# Patient Record
Sex: FEMALE | Race: WHITE | Employment: OTHER | URBAN - METROPOLITAN AREA
[De-identification: names, ages, dates, MRNs, and addresses within clinical notes are randomized per-mention and may not be internally consistent; named-entity substitution may affect disease eponyms.]

---

## 2017-01-20 ENCOUNTER — OFFICE VISIT (OUTPATIENT)
Dept: FAMILY MEDICINE CLINIC | Age: 82
End: 2017-01-20

## 2017-01-20 VITALS
BODY MASS INDEX: 18.52 KG/M2 | SYSTOLIC BLOOD PRESSURE: 122 MMHG | OXYGEN SATURATION: 91 % | DIASTOLIC BLOOD PRESSURE: 70 MMHG | WEIGHT: 98 LBS | HEART RATE: 97 BPM

## 2017-01-20 DIAGNOSIS — M15.9 PRIMARY OSTEOARTHRITIS INVOLVING MULTIPLE JOINTS: ICD-10-CM

## 2017-01-20 DIAGNOSIS — I10 ESSENTIAL HYPERTENSION: ICD-10-CM

## 2017-01-20 DIAGNOSIS — F51.05 INSOMNIA DUE TO OTHER MENTAL DISORDER: ICD-10-CM

## 2017-01-20 DIAGNOSIS — R30.0 DYSURIA: ICD-10-CM

## 2017-01-20 DIAGNOSIS — G89.4 CHRONIC PAIN SYNDROME: Primary | ICD-10-CM

## 2017-01-20 DIAGNOSIS — E53.8 VITAMIN B 12 DEFICIENCY: ICD-10-CM

## 2017-01-20 DIAGNOSIS — R63.4 WEIGHT LOSS: ICD-10-CM

## 2017-01-20 DIAGNOSIS — F01.50 VASCULAR DEMENTIA WITHOUT BEHAVIORAL DISTURBANCE (HCC): ICD-10-CM

## 2017-01-20 DIAGNOSIS — F99 INSOMNIA DUE TO OTHER MENTAL DISORDER: ICD-10-CM

## 2017-01-20 PROBLEM — G47.00 INSOMNIA: Status: ACTIVE | Noted: 2017-01-20

## 2017-01-20 LAB
BILIRUBIN, POC: NORMAL
BLOOD URINE, POC: NORMAL
CLARITY, POC: NORMAL
COLOR, POC: NORMAL
GLUCOSE URINE, POC: NORMAL
KETONES, POC: NORMAL
LEUKOCYTE EST, POC: NORMAL
NITRITE, POC: NORMAL
PH, POC: 6
PROTEIN, POC: NORMAL
SPECIFIC GRAVITY, POC: <=1.005
UROBILINOGEN, POC: 0.2

## 2017-01-20 PROCEDURE — 81002 URINALYSIS NONAUTO W/O SCOPE: CPT | Performed by: FAMILY MEDICINE

## 2017-01-20 PROCEDURE — 99214 OFFICE O/P EST MOD 30 MIN: CPT | Performed by: FAMILY MEDICINE

## 2017-01-20 RX ORDER — SULFAMETHOXAZOLE AND TRIMETHOPRIM 800; 160 MG/1; MG/1
1 TABLET ORAL 2 TIMES DAILY
Qty: 10 TABLET | Refills: 0 | Status: SHIPPED | OUTPATIENT
Start: 2017-01-20 | End: 2017-04-24 | Stop reason: ALTCHOICE

## 2017-01-20 RX ORDER — OXYCODONE HYDROCHLORIDE 10 MG/1
TABLET ORAL
Qty: 60 TABLET | Refills: 0 | Status: SHIPPED | OUTPATIENT
Start: 2017-01-20 | End: 2017-02-20 | Stop reason: SDUPTHER

## 2017-01-20 ASSESSMENT — ENCOUNTER SYMPTOMS
RESPIRATORY NEGATIVE: 1
GASTROINTESTINAL NEGATIVE: 1

## 2017-02-20 RX ORDER — OXYCODONE HYDROCHLORIDE 10 MG/1
TABLET ORAL
Qty: 60 TABLET | Refills: 0 | Status: SHIPPED | OUTPATIENT
Start: 2017-02-20 | End: 2017-03-22 | Stop reason: SDUPTHER

## 2017-02-24 ENCOUNTER — OFFICE VISIT (OUTPATIENT)
Dept: FAMILY MEDICINE CLINIC | Age: 82
End: 2017-02-24

## 2017-02-24 VITALS
HEART RATE: 96 BPM | SYSTOLIC BLOOD PRESSURE: 130 MMHG | OXYGEN SATURATION: 94 % | WEIGHT: 95 LBS | BODY MASS INDEX: 17.95 KG/M2 | DIASTOLIC BLOOD PRESSURE: 70 MMHG

## 2017-02-24 DIAGNOSIS — G89.4 CHRONIC PAIN SYNDROME: Primary | ICD-10-CM

## 2017-02-24 DIAGNOSIS — M15.9 PRIMARY OSTEOARTHRITIS INVOLVING MULTIPLE JOINTS: ICD-10-CM

## 2017-02-24 PROCEDURE — 99213 OFFICE O/P EST LOW 20 MIN: CPT | Performed by: FAMILY MEDICINE

## 2017-02-24 ASSESSMENT — ENCOUNTER SYMPTOMS
GASTROINTESTINAL NEGATIVE: 1
BACK PAIN: 1
RESPIRATORY NEGATIVE: 1

## 2017-03-21 ENCOUNTER — HOSPITAL ENCOUNTER (OUTPATIENT)
Dept: OTHER | Age: 82
Discharge: HOME | End: 2017-03-21
Attending: FAMILY MEDICINE | Admitting: FAMILY MEDICINE

## 2017-03-21 LAB
A/G RATIO: 1.4 (ref 1.1–2.2)
ALBUMIN SERPL-MCNC: 4.1 G/DL (ref 3.4–5)
ALP BLD-CCNC: 67 U/L (ref 40–129)
ALT SERPL-CCNC: 7 U/L (ref 10–40)
ANION GAP SERPL CALCULATED.3IONS-SCNC: 13 MMOL/L (ref 3–16)
AST SERPL-CCNC: 18 U/L (ref 15–37)
BASOPHILS ABSOLUTE: 0 K/UL (ref 0–0.2)
BASOPHILS RELATIVE PERCENT: 0.9 %
BILIRUB SERPL-MCNC: 0.7 MG/DL (ref 0–1)
BUN BLDV-MCNC: 10 MG/DL (ref 7–20)
CALCIUM SERPL-MCNC: 9.5 MG/DL (ref 8.3–10.6)
CHLORIDE BLD-SCNC: 99 MMOL/L (ref 99–110)
CO2: 24 MMOL/L (ref 21–32)
CREAT SERPL-MCNC: 1.1 MG/DL (ref 0.6–1.2)
EOSINOPHILS ABSOLUTE: 0.2 K/UL (ref 0–0.6)
EOSINOPHILS RELATIVE PERCENT: 4.3 %
FOLATE: 11.72 NG/ML (ref 4.78–24.2)
GFR AFRICAN AMERICAN: 58
GFR NON-AFRICAN AMERICAN: 48
GLOBULIN: 2.9 G/DL
GLUCOSE BLD-MCNC: 99 MG/DL (ref 70–99)
HCT VFR BLD CALC: 34.1 % (ref 36–48)
HEMOGLOBIN: 11.3 G/DL (ref 12–16)
LYMPHOCYTES ABSOLUTE: 1.4 K/UL (ref 1–5.1)
LYMPHOCYTES RELATIVE PERCENT: 33.9 %
MCH RBC QN AUTO: 32.7 PG (ref 26–34)
MCHC RBC AUTO-ENTMCNC: 33.1 G/DL (ref 31–36)
MCV RBC AUTO: 98.6 FL (ref 80–100)
MONOCYTES ABSOLUTE: 0.3 K/UL (ref 0–1.3)
MONOCYTES RELATIVE PERCENT: 7.7 %
NEUTROPHILS ABSOLUTE: 2.1 K/UL (ref 1.7–7.7)
NEUTROPHILS RELATIVE PERCENT: 53.2 %
PDW BLD-RTO: 13.1 % (ref 12.4–15.4)
PLATELET # BLD: 256 K/UL (ref 135–450)
PMV BLD AUTO: 10.2 FL (ref 5–10.5)
POTASSIUM SERPL-SCNC: 4.6 MMOL/L (ref 3.5–5.1)
RBC # BLD: 3.45 M/UL (ref 4–5.2)
SEDIMENTATION RATE, ERYTHROCYTE: 22 MM/HR (ref 0–30)
SODIUM BLD-SCNC: 136 MMOL/L (ref 136–145)
TOTAL PROTEIN: 7 G/DL (ref 6.4–8.2)
TSH SERPL DL<=0.05 MIU/L-ACNC: 0.27 UIU/ML (ref 0.27–4.2)
VITAMIN B-12: 742 PG/ML (ref 211–911)
WBC # BLD: 4 K/UL (ref 4–11)

## 2017-03-22 RX ORDER — OXYCODONE HYDROCHLORIDE 10 MG/1
TABLET ORAL
Qty: 60 TABLET | Refills: 0 | Status: SHIPPED | OUTPATIENT
Start: 2017-03-22 | End: 2017-04-24 | Stop reason: SDUPTHER

## 2017-04-17 RX ORDER — CITALOPRAM 20 MG/1
TABLET ORAL
Qty: 90 TABLET | Refills: 0 | Status: SHIPPED | OUTPATIENT
Start: 2017-04-17 | End: 2017-04-24 | Stop reason: ALTCHOICE

## 2017-04-24 ENCOUNTER — OFFICE VISIT (OUTPATIENT)
Dept: FAMILY MEDICINE CLINIC | Age: 82
End: 2017-04-24

## 2017-04-24 VITALS
BODY MASS INDEX: 18.1 KG/M2 | OXYGEN SATURATION: 96 % | WEIGHT: 95.8 LBS | HEART RATE: 88 BPM | RESPIRATION RATE: 16 BRPM | DIASTOLIC BLOOD PRESSURE: 78 MMHG | SYSTOLIC BLOOD PRESSURE: 124 MMHG

## 2017-04-24 DIAGNOSIS — M15.9 PRIMARY OSTEOARTHRITIS INVOLVING MULTIPLE JOINTS: ICD-10-CM

## 2017-04-24 DIAGNOSIS — G89.4 CHRONIC PAIN SYNDROME: Primary | ICD-10-CM

## 2017-04-24 PROCEDURE — 99213 OFFICE O/P EST LOW 20 MIN: CPT | Performed by: NURSE PRACTITIONER

## 2017-04-24 RX ORDER — OXYCODONE HYDROCHLORIDE 10 MG/1
TABLET ORAL
Qty: 60 TABLET | Refills: 0 | Status: CANCELLED | OUTPATIENT
Start: 2017-04-24

## 2017-04-24 RX ORDER — MISOPROSTOL 200 UG/1
200 TABLET ORAL
COMMUNITY
End: 2017-10-16 | Stop reason: SDUPTHER

## 2017-04-24 RX ORDER — OXYCODONE HYDROCHLORIDE 10 MG/1
TABLET ORAL
Qty: 60 TABLET | Refills: 0 | Status: SHIPPED | OUTPATIENT
Start: 2017-04-24 | End: 2017-05-22 | Stop reason: SDUPTHER

## 2017-04-24 ASSESSMENT — PATIENT HEALTH QUESTIONNAIRE - PHQ9
SUM OF ALL RESPONSES TO PHQ QUESTIONS 1-9: 0
SUM OF ALL RESPONSES TO PHQ9 QUESTIONS 1 & 2: 0
1. LITTLE INTEREST OR PLEASURE IN DOING THINGS: 0
2. FEELING DOWN, DEPRESSED OR HOPELESS: 0

## 2017-04-24 ASSESSMENT — ENCOUNTER SYMPTOMS
RESPIRATORY NEGATIVE: 1
SHORTNESS OF BREATH: 0
COUGH: 0
BACK PAIN: 1

## 2017-05-22 RX ORDER — OXYCODONE HYDROCHLORIDE 10 MG/1
TABLET ORAL
Qty: 60 TABLET | Refills: 0 | Status: SHIPPED | OUTPATIENT
Start: 2017-05-22 | End: 2017-06-20 | Stop reason: SDUPTHER

## 2017-06-20 RX ORDER — OXYCODONE HYDROCHLORIDE 10 MG/1
TABLET ORAL
Qty: 60 TABLET | Refills: 0 | Status: SHIPPED | OUTPATIENT
Start: 2017-06-20 | End: 2017-07-18 | Stop reason: SDUPTHER

## 2017-07-20 RX ORDER — OXYCODONE HYDROCHLORIDE 10 MG/1
TABLET ORAL
Qty: 60 TABLET | Refills: 0 | Status: SHIPPED | OUTPATIENT
Start: 2017-07-20 | End: 2017-08-17 | Stop reason: SDUPTHER

## 2017-07-26 ENCOUNTER — OFFICE VISIT (OUTPATIENT)
Dept: FAMILY MEDICINE CLINIC | Age: 82
End: 2017-07-26

## 2017-07-26 VITALS
DIASTOLIC BLOOD PRESSURE: 60 MMHG | BODY MASS INDEX: 17.56 KG/M2 | OXYGEN SATURATION: 93 % | HEART RATE: 83 BPM | HEIGHT: 61 IN | WEIGHT: 93 LBS | SYSTOLIC BLOOD PRESSURE: 124 MMHG

## 2017-07-26 DIAGNOSIS — I10 ESSENTIAL HYPERTENSION: Primary | ICD-10-CM

## 2017-07-26 DIAGNOSIS — F01.50 VASCULAR DEMENTIA WITHOUT BEHAVIORAL DISTURBANCE (HCC): ICD-10-CM

## 2017-07-26 DIAGNOSIS — G89.4 CHRONIC PAIN SYNDROME: ICD-10-CM

## 2017-07-26 PROCEDURE — 99214 OFFICE O/P EST MOD 30 MIN: CPT | Performed by: FAMILY MEDICINE

## 2017-07-26 ASSESSMENT — ENCOUNTER SYMPTOMS
SHORTNESS OF BREATH: 1
GASTROINTESTINAL NEGATIVE: 1

## 2017-08-17 RX ORDER — OXYCODONE HYDROCHLORIDE 10 MG/1
TABLET ORAL
Qty: 60 TABLET | Refills: 0 | Status: SHIPPED | OUTPATIENT
Start: 2017-08-17 | End: 2017-09-19 | Stop reason: SDUPTHER

## 2017-09-19 RX ORDER — OXYCODONE HYDROCHLORIDE 10 MG/1
TABLET ORAL
Qty: 60 TABLET | Refills: 0 | Status: SHIPPED | OUTPATIENT
Start: 2017-09-19 | End: 2017-10-17 | Stop reason: SDUPTHER

## 2017-10-16 DIAGNOSIS — I10 ESSENTIAL HYPERTENSION: ICD-10-CM

## 2017-10-16 DIAGNOSIS — E03.9 HYPOTHYROIDISM: ICD-10-CM

## 2017-10-16 RX ORDER — LEVOTHYROXINE SODIUM 0.07 MG/1
TABLET ORAL
Qty: 90 TABLET | Refills: 3 | Status: SHIPPED | OUTPATIENT
Start: 2017-10-16 | End: 2018-10-11 | Stop reason: SDUPTHER

## 2017-10-16 RX ORDER — PRAVASTATIN SODIUM 20 MG
TABLET ORAL
Qty: 90 TABLET | Refills: 3 | Status: SHIPPED | OUTPATIENT
Start: 2017-10-16 | End: 2018-08-27

## 2017-10-16 RX ORDER — MISOPROSTOL 200 UG/1
TABLET ORAL
Qty: 180 TABLET | Refills: 3 | Status: SHIPPED | OUTPATIENT
Start: 2017-10-16 | End: 2019-08-05 | Stop reason: SDUPTHER

## 2017-10-16 RX ORDER — ENALAPRIL MALEATE 5 MG/1
TABLET ORAL
Qty: 90 TABLET | Refills: 3 | Status: SHIPPED | OUTPATIENT
Start: 2017-10-16 | End: 2018-10-11 | Stop reason: SDUPTHER

## 2017-10-18 RX ORDER — OXYCODONE HYDROCHLORIDE 10 MG/1
TABLET ORAL
Qty: 60 TABLET | Refills: 0 | Status: SHIPPED | OUTPATIENT
Start: 2017-10-18 | End: 2017-11-16 | Stop reason: SDUPTHER

## 2017-11-16 ENCOUNTER — OFFICE VISIT (OUTPATIENT)
Dept: FAMILY MEDICINE CLINIC | Age: 82
End: 2017-11-16

## 2017-11-16 VITALS
SYSTOLIC BLOOD PRESSURE: 100 MMHG | BODY MASS INDEX: 17.56 KG/M2 | DIASTOLIC BLOOD PRESSURE: 64 MMHG | OXYGEN SATURATION: 94 % | WEIGHT: 93 LBS | HEART RATE: 82 BPM | HEIGHT: 61 IN

## 2017-11-16 DIAGNOSIS — R22.1 LOCALIZED SWELLING, MASS AND LUMP, NECK: ICD-10-CM

## 2017-11-16 DIAGNOSIS — M15.9 PRIMARY OSTEOARTHRITIS INVOLVING MULTIPLE JOINTS: ICD-10-CM

## 2017-11-16 DIAGNOSIS — G89.4 CHRONIC PAIN SYNDROME: Primary | ICD-10-CM

## 2017-11-16 PROCEDURE — 90662 IIV NO PRSV INCREASED AG IM: CPT | Performed by: FAMILY MEDICINE

## 2017-11-16 PROCEDURE — 4040F PNEUMOC VAC/ADMIN/RCVD: CPT | Performed by: FAMILY MEDICINE

## 2017-11-16 PROCEDURE — G8418 CALC BMI BLW LOW PARAM F/U: HCPCS | Performed by: FAMILY MEDICINE

## 2017-11-16 PROCEDURE — G8400 PT W/DXA NO RESULTS DOC: HCPCS | Performed by: FAMILY MEDICINE

## 2017-11-16 PROCEDURE — 1090F PRES/ABSN URINE INCON ASSESS: CPT | Performed by: FAMILY MEDICINE

## 2017-11-16 PROCEDURE — 1123F ACP DISCUSS/DSCN MKR DOCD: CPT | Performed by: FAMILY MEDICINE

## 2017-11-16 PROCEDURE — 1036F TOBACCO NON-USER: CPT | Performed by: FAMILY MEDICINE

## 2017-11-16 PROCEDURE — G0008 ADMIN INFLUENZA VIRUS VAC: HCPCS | Performed by: FAMILY MEDICINE

## 2017-11-16 PROCEDURE — G8427 DOCREV CUR MEDS BY ELIG CLIN: HCPCS | Performed by: FAMILY MEDICINE

## 2017-11-16 PROCEDURE — 99213 OFFICE O/P EST LOW 20 MIN: CPT | Performed by: FAMILY MEDICINE

## 2017-11-16 PROCEDURE — G8484 FLU IMMUNIZE NO ADMIN: HCPCS | Performed by: FAMILY MEDICINE

## 2017-11-16 RX ORDER — OXYCODONE HYDROCHLORIDE 10 MG/1
TABLET ORAL
Qty: 60 TABLET | Refills: 0 | Status: SHIPPED | OUTPATIENT
Start: 2017-11-16 | End: 2017-12-18 | Stop reason: SDUPTHER

## 2017-11-16 RX ORDER — DIPHENHYDRAMINE HCL 25 MG
25 CAPSULE ORAL EVERY 6 HOURS PRN
COMMUNITY
End: 2021-10-26 | Stop reason: ALTCHOICE

## 2017-11-16 ASSESSMENT — ENCOUNTER SYMPTOMS: RESPIRATORY NEGATIVE: 1

## 2017-11-16 NOTE — PROGRESS NOTES
syndrome  Continue meds-use heat  Primary osteoarthritis involving multiple joints  As above  Localized swelling, mass and lump, neck  Refer for CT neck

## 2017-11-22 ENCOUNTER — HOSPITAL ENCOUNTER (OUTPATIENT)
Dept: CT IMAGING | Age: 82
Discharge: HOME | End: 2017-11-22
Attending: FAMILY MEDICINE | Admitting: FAMILY MEDICINE

## 2017-11-22 DIAGNOSIS — R22.1 LOCALIZED SWELLING, MASS AND LUMP, NECK: ICD-10-CM

## 2017-11-22 DIAGNOSIS — R22.1 LOCALIZED SWELLING, MASS OR LUMP OF NECK: ICD-10-CM

## 2017-12-18 RX ORDER — OXYCODONE HYDROCHLORIDE 10 MG/1
TABLET ORAL
Qty: 60 TABLET | Refills: 0 | Status: SHIPPED | OUTPATIENT
Start: 2017-12-18 | End: 2018-01-17 | Stop reason: SDUPTHER

## 2018-01-15 RX ORDER — CITALOPRAM 20 MG/1
TABLET ORAL
Qty: 90 TABLET | Refills: 1 | Status: SHIPPED | OUTPATIENT
Start: 2018-01-15 | End: 2018-08-27

## 2018-01-17 DIAGNOSIS — G89.4 CHRONIC PAIN SYNDROME: Primary | ICD-10-CM

## 2018-01-17 NOTE — TELEPHONE ENCOUNTER
Last Seen: 2017    Last Written: 2017     Last UDS: No UDS on file. OARRS Run On: Please run OARRS    Med Agreement Signed On: 2015    Next Appointment: 2018    Requested Prescriptions     Pending Prescriptions Disp Refills    oxyCODONE HCl (OXY-IR) 10 MG immediate release tablet 60 tablet 0     Si bid prn pain.

## 2018-01-18 RX ORDER — OXYCODONE HYDROCHLORIDE 10 MG/1
TABLET ORAL
Qty: 60 TABLET | Refills: 0 | Status: SHIPPED | OUTPATIENT
Start: 2018-01-18 | End: 2018-02-15 | Stop reason: SDUPTHER

## 2018-02-15 DIAGNOSIS — G89.4 CHRONIC PAIN SYNDROME: ICD-10-CM

## 2018-02-15 RX ORDER — OXYCODONE HYDROCHLORIDE 10 MG/1
TABLET ORAL
Qty: 60 TABLET | Refills: 0 | Status: SHIPPED | OUTPATIENT
Start: 2018-02-15 | End: 2018-03-21 | Stop reason: SDUPTHER

## 2018-02-21 ENCOUNTER — OFFICE VISIT (OUTPATIENT)
Dept: FAMILY MEDICINE CLINIC | Age: 83
End: 2018-02-21

## 2018-02-21 VITALS
HEART RATE: 79 BPM | BODY MASS INDEX: 17.94 KG/M2 | WEIGHT: 95 LBS | SYSTOLIC BLOOD PRESSURE: 118 MMHG | OXYGEN SATURATION: 86 % | DIASTOLIC BLOOD PRESSURE: 60 MMHG | HEIGHT: 61 IN

## 2018-02-21 DIAGNOSIS — M15.9 PRIMARY OSTEOARTHRITIS INVOLVING MULTIPLE JOINTS: Primary | ICD-10-CM

## 2018-02-21 DIAGNOSIS — I10 ESSENTIAL HYPERTENSION: ICD-10-CM

## 2018-02-21 PROCEDURE — 4040F PNEUMOC VAC/ADMIN/RCVD: CPT | Performed by: FAMILY MEDICINE

## 2018-02-21 PROCEDURE — G8484 FLU IMMUNIZE NO ADMIN: HCPCS | Performed by: FAMILY MEDICINE

## 2018-02-21 PROCEDURE — 1123F ACP DISCUSS/DSCN MKR DOCD: CPT | Performed by: FAMILY MEDICINE

## 2018-02-21 PROCEDURE — G8419 CALC BMI OUT NRM PARAM NOF/U: HCPCS | Performed by: FAMILY MEDICINE

## 2018-02-21 PROCEDURE — G8427 DOCREV CUR MEDS BY ELIG CLIN: HCPCS | Performed by: FAMILY MEDICINE

## 2018-02-21 PROCEDURE — 1090F PRES/ABSN URINE INCON ASSESS: CPT | Performed by: FAMILY MEDICINE

## 2018-02-21 PROCEDURE — G8400 PT W/DXA NO RESULTS DOC: HCPCS | Performed by: FAMILY MEDICINE

## 2018-02-21 PROCEDURE — 1036F TOBACCO NON-USER: CPT | Performed by: FAMILY MEDICINE

## 2018-02-21 PROCEDURE — 99213 OFFICE O/P EST LOW 20 MIN: CPT | Performed by: FAMILY MEDICINE

## 2018-02-21 ASSESSMENT — ENCOUNTER SYMPTOMS
RESPIRATORY NEGATIVE: 1
GASTROINTESTINAL NEGATIVE: 1

## 2018-02-21 NOTE — PATIENT INSTRUCTIONS
Medardo Hernández was seen today for 3 month follow-up and chronic pain.     Diagnoses and all orders for this visit:    Primary osteoarthritis involving multiple joints  Med s as needed-heta  Essential hypertension  Continue meds-STEVEN diet    See me 3 mos

## 2018-02-21 NOTE — PROGRESS NOTES
hypertension            Plan:      Brittny Villegasdi was seen today for 3 month follow-up and chronic pain.     Diagnoses and all orders for this visit:    Primary osteoarthritis involving multiple joints  Med s as needed-heta  Essential hypertension  Continue meds-STEVEN diet    See me 3 mos

## 2018-03-21 DIAGNOSIS — G89.4 CHRONIC PAIN SYNDROME: ICD-10-CM

## 2018-03-21 RX ORDER — OXYCODONE HYDROCHLORIDE 10 MG/1
TABLET ORAL
Qty: 60 TABLET | Refills: 0 | Status: SHIPPED | OUTPATIENT
Start: 2018-03-21 | End: 2018-03-22 | Stop reason: SDUPTHER

## 2018-03-22 DIAGNOSIS — G89.4 CHRONIC PAIN SYNDROME: ICD-10-CM

## 2018-03-22 RX ORDER — OXYCODONE HYDROCHLORIDE 10 MG/1
TABLET ORAL
Qty: 60 TABLET | Refills: 0 | Status: SHIPPED | OUTPATIENT
Start: 2018-03-22 | End: 2018-04-19 | Stop reason: SDUPTHER

## 2018-04-16 DIAGNOSIS — G89.4 CHRONIC PAIN SYNDROME: ICD-10-CM

## 2018-04-18 RX ORDER — OXYCODONE HYDROCHLORIDE 10 MG/1
TABLET ORAL
Qty: 60 TABLET | Refills: 0 | OUTPATIENT
Start: 2018-04-18 | End: 2018-05-15

## 2018-04-19 DIAGNOSIS — G89.4 CHRONIC PAIN SYNDROME: ICD-10-CM

## 2018-04-19 RX ORDER — OXYCODONE HYDROCHLORIDE 10 MG/1
TABLET ORAL
Qty: 60 TABLET | Refills: 0 | Status: SHIPPED | OUTPATIENT
Start: 2018-04-19 | End: 2018-05-21 | Stop reason: SDUPTHER

## 2018-05-21 ENCOUNTER — TELEPHONE (OUTPATIENT)
Dept: FAMILY MEDICINE CLINIC | Age: 83
End: 2018-05-21

## 2018-05-21 ENCOUNTER — OFFICE VISIT (OUTPATIENT)
Dept: FAMILY MEDICINE CLINIC | Age: 83
End: 2018-05-21

## 2018-05-21 VITALS
OXYGEN SATURATION: 98 % | BODY MASS INDEX: 17.94 KG/M2 | DIASTOLIC BLOOD PRESSURE: 60 MMHG | HEART RATE: 80 BPM | HEIGHT: 61 IN | SYSTOLIC BLOOD PRESSURE: 100 MMHG | WEIGHT: 95 LBS

## 2018-05-21 DIAGNOSIS — G89.4 CHRONIC PAIN SYNDROME: Primary | ICD-10-CM

## 2018-05-21 DIAGNOSIS — G89.4 CHRONIC PAIN SYNDROME: ICD-10-CM

## 2018-05-21 DIAGNOSIS — M15.9 PRIMARY OSTEOARTHRITIS INVOLVING MULTIPLE JOINTS: ICD-10-CM

## 2018-05-21 PROCEDURE — G8400 PT W/DXA NO RESULTS DOC: HCPCS | Performed by: FAMILY MEDICINE

## 2018-05-21 PROCEDURE — G8427 DOCREV CUR MEDS BY ELIG CLIN: HCPCS | Performed by: FAMILY MEDICINE

## 2018-05-21 PROCEDURE — G8418 CALC BMI BLW LOW PARAM F/U: HCPCS | Performed by: FAMILY MEDICINE

## 2018-05-21 PROCEDURE — 1090F PRES/ABSN URINE INCON ASSESS: CPT | Performed by: FAMILY MEDICINE

## 2018-05-21 PROCEDURE — 1036F TOBACCO NON-USER: CPT | Performed by: FAMILY MEDICINE

## 2018-05-21 PROCEDURE — 1123F ACP DISCUSS/DSCN MKR DOCD: CPT | Performed by: FAMILY MEDICINE

## 2018-05-21 PROCEDURE — 4040F PNEUMOC VAC/ADMIN/RCVD: CPT | Performed by: FAMILY MEDICINE

## 2018-05-21 PROCEDURE — 99213 OFFICE O/P EST LOW 20 MIN: CPT | Performed by: FAMILY MEDICINE

## 2018-05-21 RX ORDER — OXYCODONE HYDROCHLORIDE 10 MG/1
TABLET ORAL
Qty: 60 TABLET | Refills: 0 | Status: SHIPPED | OUTPATIENT
Start: 2018-05-21 | End: 2018-06-18 | Stop reason: SDUPTHER

## 2018-05-21 RX ORDER — OXYCODONE HYDROCHLORIDE 10 MG/1
TABLET ORAL
Qty: 60 TABLET | Refills: 0 | Status: SHIPPED | OUTPATIENT
Start: 2018-05-21 | End: 2018-05-21 | Stop reason: SDUPTHER

## 2018-05-21 RX ORDER — OXYCODONE HYDROCHLORIDE 10 MG/1
TABLET ORAL
Qty: 60 TABLET | Refills: 0 | OUTPATIENT
Start: 2018-05-21 | End: 2018-06-19

## 2018-05-21 ASSESSMENT — PATIENT HEALTH QUESTIONNAIRE - PHQ9
2. FEELING DOWN, DEPRESSED OR HOPELESS: 0
1. LITTLE INTEREST OR PLEASURE IN DOING THINGS: 0
SUM OF ALL RESPONSES TO PHQ QUESTIONS 1-9: 0
SUM OF ALL RESPONSES TO PHQ9 QUESTIONS 1 & 2: 0

## 2018-05-21 ASSESSMENT — ENCOUNTER SYMPTOMS
RESPIRATORY NEGATIVE: 1
GASTROINTESTINAL NEGATIVE: 1

## 2018-06-18 DIAGNOSIS — G89.4 CHRONIC PAIN SYNDROME: ICD-10-CM

## 2018-06-20 RX ORDER — OXYCODONE HYDROCHLORIDE 10 MG/1
TABLET ORAL
Qty: 60 TABLET | Refills: 0 | Status: SHIPPED | OUTPATIENT
Start: 2018-06-20 | End: 2018-07-18 | Stop reason: SDUPTHER

## 2018-07-18 DIAGNOSIS — G89.4 CHRONIC PAIN SYNDROME: ICD-10-CM

## 2018-07-19 RX ORDER — OXYCODONE HYDROCHLORIDE 10 MG/1
TABLET ORAL
Qty: 60 TABLET | Refills: 0 | Status: SHIPPED | OUTPATIENT
Start: 2018-07-19 | End: 2018-08-15 | Stop reason: SDUPTHER

## 2018-08-15 DIAGNOSIS — G89.4 CHRONIC PAIN SYNDROME: ICD-10-CM

## 2018-08-15 NOTE — TELEPHONE ENCOUNTER
Pt's daughter in law, Bryce Peng (not on hipaa) called asking for a refill on pt's Oxycodone 10 mg sent to [facility]. Last Seen: 2018    Last Writen: 18    Last UDS:     OARRS Run On: 18    Med Agreement Signed On:     Next Appointment: 2018    Requested Prescriptions     Pending Prescriptions Disp Refills    oxyCODONE HCl (OXY-IR) 10 MG immediate release tablet 60 tablet 0     Si bid prn pain.  Earliest Fill Date: 8/15/18

## 2018-08-17 RX ORDER — OXYCODONE HYDROCHLORIDE 10 MG/1
TABLET ORAL
Qty: 60 TABLET | Refills: 0 | Status: SHIPPED | OUTPATIENT
Start: 2018-08-17 | End: 2018-09-17 | Stop reason: SDUPTHER

## 2018-08-27 ENCOUNTER — OFFICE VISIT (OUTPATIENT)
Dept: FAMILY MEDICINE CLINIC | Age: 83
End: 2018-08-27

## 2018-08-27 VITALS
OXYGEN SATURATION: 96 % | HEART RATE: 76 BPM | BODY MASS INDEX: 17.94 KG/M2 | WEIGHT: 95 LBS | DIASTOLIC BLOOD PRESSURE: 64 MMHG | SYSTOLIC BLOOD PRESSURE: 120 MMHG | HEIGHT: 61 IN

## 2018-08-27 DIAGNOSIS — G89.4 CHRONIC PAIN SYNDROME: Primary | ICD-10-CM

## 2018-08-27 DIAGNOSIS — I10 ESSENTIAL HYPERTENSION: ICD-10-CM

## 2018-08-27 DIAGNOSIS — M15.9 PRIMARY OSTEOARTHRITIS INVOLVING MULTIPLE JOINTS: ICD-10-CM

## 2018-08-27 PROCEDURE — 1123F ACP DISCUSS/DSCN MKR DOCD: CPT | Performed by: FAMILY MEDICINE

## 2018-08-27 PROCEDURE — 4040F PNEUMOC VAC/ADMIN/RCVD: CPT | Performed by: FAMILY MEDICINE

## 2018-08-27 PROCEDURE — G8419 CALC BMI OUT NRM PARAM NOF/U: HCPCS | Performed by: FAMILY MEDICINE

## 2018-08-27 PROCEDURE — G8400 PT W/DXA NO RESULTS DOC: HCPCS | Performed by: FAMILY MEDICINE

## 2018-08-27 PROCEDURE — 99214 OFFICE O/P EST MOD 30 MIN: CPT | Performed by: FAMILY MEDICINE

## 2018-08-27 PROCEDURE — 1101F PT FALLS ASSESS-DOCD LE1/YR: CPT | Performed by: FAMILY MEDICINE

## 2018-08-27 PROCEDURE — 1090F PRES/ABSN URINE INCON ASSESS: CPT | Performed by: FAMILY MEDICINE

## 2018-08-27 PROCEDURE — G8427 DOCREV CUR MEDS BY ELIG CLIN: HCPCS | Performed by: FAMILY MEDICINE

## 2018-08-27 PROCEDURE — 1036F TOBACCO NON-USER: CPT | Performed by: FAMILY MEDICINE

## 2018-08-27 ASSESSMENT — ENCOUNTER SYMPTOMS
ABDOMINAL PAIN: 0
BLOOD IN STOOL: 0

## 2018-08-27 NOTE — PATIENT INSTRUCTIONS
Raven Avelar was seen today for 3 month follow-up and chronic pain.     Diagnoses and all orders for this visit:    Chronic pain syndrome  Comments:  knees & hands  Meds as needed-Rx Gabap 300 hs-call me 1 week  Essential hypertension  Continue meds-STEVEN diet  Primary osteoarthritis involving multiple joints  Meds as needed-keep active    See me 3 mos

## 2018-09-17 DIAGNOSIS — G89.4 CHRONIC PAIN SYNDROME: ICD-10-CM

## 2018-09-17 RX ORDER — OXYCODONE HYDROCHLORIDE 10 MG/1
10 TABLET ORAL 2 TIMES DAILY PRN
Qty: 60 TABLET | Refills: 0 | Status: SHIPPED | OUTPATIENT
Start: 2018-09-17 | End: 2018-09-18 | Stop reason: SDUPTHER

## 2018-09-17 NOTE — TELEPHONE ENCOUNTER
Pt's daughter in law, Elida Last, called asking for pt's Oxycodone sent to   [facility]    Last Seen: 2018    Last Writen: 18    Last UDS:     OARRS Run On: 18    Med Agreement Signed On: 4/21/15    Next Appointment: 18    Requested Prescriptions     Pending Prescriptions Disp Refills    oxyCODONE HCl (OXY-IR) 10 MG immediate release tablet 60 tablet 0     Si bid prn pain.

## 2018-09-18 DIAGNOSIS — G89.4 CHRONIC PAIN SYNDROME: ICD-10-CM

## 2018-09-19 RX ORDER — OXYCODONE HYDROCHLORIDE 10 MG/1
10 TABLET ORAL 2 TIMES DAILY PRN
Qty: 60 TABLET | Refills: 0 | Status: SHIPPED | OUTPATIENT
Start: 2018-09-19 | End: 2018-10-16 | Stop reason: SDUPTHER

## 2018-10-11 DIAGNOSIS — E03.9 HYPOTHYROIDISM: ICD-10-CM

## 2018-10-11 DIAGNOSIS — I10 ESSENTIAL HYPERTENSION: ICD-10-CM

## 2018-10-11 RX ORDER — ENALAPRIL MALEATE 5 MG/1
TABLET ORAL
Qty: 90 TABLET | Refills: 3 | Status: SHIPPED | OUTPATIENT
Start: 2018-10-11 | End: 2019-10-07 | Stop reason: SDUPTHER

## 2018-10-11 RX ORDER — LEVOTHYROXINE SODIUM 0.07 MG/1
TABLET ORAL
Qty: 90 TABLET | Refills: 3 | Status: SHIPPED | OUTPATIENT
Start: 2018-10-11 | End: 2019-10-07 | Stop reason: SDUPTHER

## 2018-10-11 NOTE — TELEPHONE ENCOUNTER
.  Last office visit 8/27/2018     Last written 10-16-17 #90 with 3 refills      Next office visit scheduled 11-28-18    Requested Prescriptions     Pending Prescriptions Disp Refills    enalapril (VASOTEC) 5 MG tablet [Pharmacy Med Name: ENALAPRIL MAL TABS 5MG] 90 tablet 3     Sig: TAKE 1 TABLET DAILY    levothyroxine (SYNTHROID) 75 MCG tablet [Pharmacy Med Name: L-THYROXINE (SYNTHROID) TABS 75MCG] 90 tablet 3     Sig: TAKE 1 TABLET DAILY

## 2018-10-16 DIAGNOSIS — G89.4 CHRONIC PAIN SYNDROME: ICD-10-CM

## 2018-10-16 NOTE — TELEPHONE ENCOUNTER
Last Seen: 8/27/18    Last Written: 9-19-18    Last UDS: do not see one    OARRS Run On: 8-27-18    Med Agreement Signed On: 4-10-15 for norco, do not see one for oxycodone    Next Appointment: 11/28/2018    Requested Prescriptions     Pending Prescriptions Disp Refills    oxyCODONE HCl (OXY-IR) 10 MG immediate release tablet 60 tablet 0     Sig: Take 1 tablet by mouth 2 times daily as needed for Pain for up to 30 days. 1 bid prn pain.

## 2018-10-18 RX ORDER — OXYCODONE HYDROCHLORIDE 10 MG/1
10 TABLET ORAL 2 TIMES DAILY PRN
Qty: 60 TABLET | Refills: 0 | Status: SHIPPED | OUTPATIENT
Start: 2018-10-18 | End: 2018-11-16 | Stop reason: SDUPTHER

## 2018-11-12 DIAGNOSIS — G89.4 CHRONIC PAIN SYNDROME: ICD-10-CM

## 2018-11-13 RX ORDER — OXYCODONE HYDROCHLORIDE 10 MG/1
10 TABLET ORAL 2 TIMES DAILY PRN
Qty: 60 TABLET | Refills: 0 | OUTPATIENT
Start: 2018-11-13 | End: 2018-12-13

## 2018-11-16 DIAGNOSIS — G89.4 CHRONIC PAIN SYNDROME: ICD-10-CM

## 2018-11-16 RX ORDER — OXYCODONE HYDROCHLORIDE 10 MG/1
10 TABLET ORAL 2 TIMES DAILY PRN
Qty: 60 TABLET | Refills: 0 | Status: SHIPPED | OUTPATIENT
Start: 2018-11-16 | End: 2018-11-19 | Stop reason: SDUPTHER

## 2018-11-19 ENCOUNTER — TELEPHONE (OUTPATIENT)
Dept: FAMILY MEDICINE CLINIC | Age: 83
End: 2018-11-19

## 2018-11-19 DIAGNOSIS — G89.4 CHRONIC PAIN SYNDROME: ICD-10-CM

## 2018-11-19 RX ORDER — OXYCODONE HYDROCHLORIDE 10 MG/1
10 TABLET ORAL 2 TIMES DAILY PRN
Qty: 60 TABLET | Refills: 0 | Status: SHIPPED | OUTPATIENT
Start: 2018-11-19 | End: 2018-12-17 | Stop reason: SDUPTHER

## 2018-11-28 ENCOUNTER — OFFICE VISIT (OUTPATIENT)
Dept: FAMILY MEDICINE CLINIC | Age: 83
End: 2018-11-28
Payer: MEDICARE

## 2018-11-28 VITALS
DIASTOLIC BLOOD PRESSURE: 60 MMHG | OXYGEN SATURATION: 92 % | BODY MASS INDEX: 19.03 KG/M2 | HEART RATE: 83 BPM | WEIGHT: 100.8 LBS | HEIGHT: 61 IN | SYSTOLIC BLOOD PRESSURE: 100 MMHG

## 2018-11-28 DIAGNOSIS — F01.50 VASCULAR DEMENTIA WITHOUT BEHAVIORAL DISTURBANCE (HCC): ICD-10-CM

## 2018-11-28 DIAGNOSIS — I10 ESSENTIAL HYPERTENSION: ICD-10-CM

## 2018-11-28 DIAGNOSIS — G89.4 CHRONIC PAIN SYNDROME: Primary | ICD-10-CM

## 2018-11-28 PROCEDURE — 4040F PNEUMOC VAC/ADMIN/RCVD: CPT | Performed by: FAMILY MEDICINE

## 2018-11-28 PROCEDURE — 1090F PRES/ABSN URINE INCON ASSESS: CPT | Performed by: FAMILY MEDICINE

## 2018-11-28 PROCEDURE — G0008 ADMIN INFLUENZA VIRUS VAC: HCPCS | Performed by: FAMILY MEDICINE

## 2018-11-28 PROCEDURE — 1123F ACP DISCUSS/DSCN MKR DOCD: CPT | Performed by: FAMILY MEDICINE

## 2018-11-28 PROCEDURE — 90662 IIV NO PRSV INCREASED AG IM: CPT | Performed by: FAMILY MEDICINE

## 2018-11-28 PROCEDURE — G8482 FLU IMMUNIZE ORDER/ADMIN: HCPCS | Performed by: FAMILY MEDICINE

## 2018-11-28 PROCEDURE — G8427 DOCREV CUR MEDS BY ELIG CLIN: HCPCS | Performed by: FAMILY MEDICINE

## 2018-11-28 PROCEDURE — 99214 OFFICE O/P EST MOD 30 MIN: CPT | Performed by: FAMILY MEDICINE

## 2018-11-28 PROCEDURE — 1036F TOBACCO NON-USER: CPT | Performed by: FAMILY MEDICINE

## 2018-11-28 PROCEDURE — G8400 PT W/DXA NO RESULTS DOC: HCPCS | Performed by: FAMILY MEDICINE

## 2018-11-28 PROCEDURE — G8420 CALC BMI NORM PARAMETERS: HCPCS | Performed by: FAMILY MEDICINE

## 2018-11-28 PROCEDURE — 1101F PT FALLS ASSESS-DOCD LE1/YR: CPT | Performed by: FAMILY MEDICINE

## 2018-11-28 ASSESSMENT — ENCOUNTER SYMPTOMS
BACK PAIN: 1
BLOOD IN STOOL: 0
SHORTNESS OF BREATH: 0
COUGH: 0
ABDOMINAL PAIN: 0

## 2018-11-28 NOTE — PROGRESS NOTES
Objective:   Physical Exam     Physical Exam   HENT:   Mouth/Throat: Oropharynx is clear and moist.   Eyes: Conjunctivae are normal.   Cardiovascular: Normal rate, regular rhythm and normal heart sounds. Pulmonary/Chest: Effort normal and breath sounds normal.   Abdominal: Soft. There is no tenderness. Lymphadenopathy:     She has no cervical adenopathy. Assessment:      1. Chronic pain syndrome    2. Essential hypertension    3. Vascular dementia without behavioral disturbance            Plan:      Kathrin Toro was seen today for 3 month follow-up.     Diagnoses and all orders for this visit:    Chronic pain syndrome  Meds as needed-daughter will check re:placebo  Essential hypertension  Continue meds-STEVEN diet  Vascular dementia without behavioral disturbance  Continue meds  Other orders  -     Drug Panel-PM-HI Res-UR Interp-A    See me 4 mos          Brandan Bradshaw, DO

## 2018-12-17 DIAGNOSIS — G89.4 CHRONIC PAIN SYNDROME: ICD-10-CM

## 2018-12-17 NOTE — TELEPHONE ENCOUNTER
Last Seen: 11/28/2018    Last Written: 11-19-18    Last UDS: do not see one    OARRS Run On: 8-27-18    Med Agreement Signed On: do not see one    Next Appointment: 3/28/2019    Requested Prescriptions     Pending Prescriptions Disp Refills    oxyCODONE HCl (OXY-IR) 10 MG immediate release tablet 60 tablet 0     Sig: Take 1 tablet by mouth 2 times daily as needed for Pain for up to 30 days. 1 bid prn pain.

## 2018-12-19 RX ORDER — OXYCODONE HYDROCHLORIDE 10 MG/1
10 TABLET ORAL 2 TIMES DAILY PRN
Qty: 60 TABLET | Refills: 0 | Status: SHIPPED | OUTPATIENT
Start: 2018-12-19 | End: 2019-01-16 | Stop reason: SDUPTHER

## 2019-01-16 DIAGNOSIS — G89.4 CHRONIC PAIN SYNDROME: ICD-10-CM

## 2019-01-18 RX ORDER — OXYCODONE HYDROCHLORIDE 10 MG/1
10 TABLET ORAL 2 TIMES DAILY PRN
Qty: 60 TABLET | Refills: 0 | Status: SHIPPED | OUTPATIENT
Start: 2019-01-18 | End: 2019-02-18 | Stop reason: SDUPTHER

## 2019-02-18 DIAGNOSIS — G89.4 CHRONIC PAIN SYNDROME: ICD-10-CM

## 2019-02-18 RX ORDER — OXYCODONE HYDROCHLORIDE 10 MG/1
10 TABLET ORAL 2 TIMES DAILY PRN
Qty: 60 TABLET | Refills: 0 | Status: SHIPPED | OUTPATIENT
Start: 2019-02-18 | End: 2019-03-20

## 2019-03-19 DIAGNOSIS — G89.4 CHRONIC PAIN SYNDROME: ICD-10-CM

## 2019-03-19 DIAGNOSIS — G89.4 CHRONIC PAIN SYNDROME: Primary | ICD-10-CM

## 2019-03-19 RX ORDER — OXYCODONE HYDROCHLORIDE AND ACETAMINOPHEN 5; 325 MG/1; MG/1
1 TABLET ORAL 2 TIMES DAILY PRN
Qty: 60 TABLET | Refills: 0 | Status: SHIPPED | OUTPATIENT
Start: 2019-03-19 | End: 2019-04-16 | Stop reason: SDUPTHER

## 2019-03-20 RX ORDER — OXYCODONE HYDROCHLORIDE 10 MG/1
10 TABLET ORAL 2 TIMES DAILY PRN
Qty: 60 TABLET | Refills: 0 | OUTPATIENT
Start: 2019-03-20 | End: 2019-04-19

## 2019-03-21 RX ORDER — GABAPENTIN 300 MG/1
CAPSULE ORAL
Qty: 90 CAPSULE | Refills: 1 | Status: SHIPPED | OUTPATIENT
Start: 2019-03-21 | End: 2019-11-05 | Stop reason: SDUPTHER

## 2019-04-04 ENCOUNTER — OFFICE VISIT (OUTPATIENT)
Dept: FAMILY MEDICINE CLINIC | Age: 84
End: 2019-04-04
Payer: MEDICARE

## 2019-04-04 VITALS
SYSTOLIC BLOOD PRESSURE: 112 MMHG | OXYGEN SATURATION: 82 % | BODY MASS INDEX: 19.83 KG/M2 | WEIGHT: 105 LBS | HEIGHT: 61 IN | HEART RATE: 82 BPM | DIASTOLIC BLOOD PRESSURE: 60 MMHG

## 2019-04-04 DIAGNOSIS — M15.9 PRIMARY OSTEOARTHRITIS INVOLVING MULTIPLE JOINTS: ICD-10-CM

## 2019-04-04 DIAGNOSIS — I10 ESSENTIAL HYPERTENSION: ICD-10-CM

## 2019-04-04 DIAGNOSIS — G89.4 CHRONIC PAIN SYNDROME: Primary | ICD-10-CM

## 2019-04-04 PROCEDURE — G8420 CALC BMI NORM PARAMETERS: HCPCS | Performed by: FAMILY MEDICINE

## 2019-04-04 PROCEDURE — G8427 DOCREV CUR MEDS BY ELIG CLIN: HCPCS | Performed by: FAMILY MEDICINE

## 2019-04-04 PROCEDURE — 1036F TOBACCO NON-USER: CPT | Performed by: FAMILY MEDICINE

## 2019-04-04 PROCEDURE — 99214 OFFICE O/P EST MOD 30 MIN: CPT | Performed by: FAMILY MEDICINE

## 2019-04-04 PROCEDURE — 1123F ACP DISCUSS/DSCN MKR DOCD: CPT | Performed by: FAMILY MEDICINE

## 2019-04-04 PROCEDURE — 4040F PNEUMOC VAC/ADMIN/RCVD: CPT | Performed by: FAMILY MEDICINE

## 2019-04-04 PROCEDURE — 1090F PRES/ABSN URINE INCON ASSESS: CPT | Performed by: FAMILY MEDICINE

## 2019-04-04 PROCEDURE — G8400 PT W/DXA NO RESULTS DOC: HCPCS | Performed by: FAMILY MEDICINE

## 2019-04-04 ASSESSMENT — ENCOUNTER SYMPTOMS
COUGH: 0
ABDOMINAL PAIN: 0
SHORTNESS OF BREATH: 0
BLOOD IN STOOL: 0

## 2019-04-04 NOTE — PROGRESS NOTES
Subjective:      Patient ID: Ramy Andres is a 80 y.o. female. HPI  In for check on Chr Knee Pain(we decreased pain med last Rx-now up half the night-day seems OK)--HT(OK at home)    Prior to Visit Medications :  Medication Fexofenadine-Pseudoephedrine (ALLEGRA-D PO), Sig Take by mouth, Taking? Yes, Authorizing Provider Historical Provider, MD    Medication oxyCODONE-acetaminophen (PERCOCET) 5-325 MG per tablet, Sig Take 1 tablet by mouth 2 times daily as needed for Pain for up to 30 days. , Taking? Yes, Authorizing Provider Brandan Bradshaw, DO    Medication enalapril (VASOTEC) 5 MG tablet, Sig TAKE 1 TABLET DAILY, Taking? Yes, Authorizing Provider Brandan Bradshaw, DO    Medication levothyroxine (SYNTHROID) 75 MCG tablet, Sig TAKE 1 TABLET DAILY, Taking? Yes, Authorizing Provider Brandan Bradshaw, DO    Medication diphenhydrAMINE (BENADRYL) 25 MG capsule, Sig Take 25 mg by mouth every 6 hours as needed for Itching, Taking? Yes, Authorizing Provider Historical Provider, MD    Medication misoprostol (CYTOTEC) 200 MCG tablet, Sig TAKE 1 TABLET TWICE A DAY  Patient taking differently: TAKE 1 TABLET Once per day, Taking? Yes, Authorizing Provider Brandan Bradshaw, DO    Medication gabapentin (NEURONTIN) 300 MG capsule, Sig TAKE 1 CAPSULE AT BEDTIME, Taking? , Authorizing Provider Chantell Bradshaw, DO    Medication Gabapentin, Once-Daily, 300 MG TABS, Sig 1 hs., Taking? , Authorizing Provider Martir Johnson, DO      Past Medical History:  No date: Depression  No date: Hyperlipidemia  No date: Hypertension  No date: Hypothyroidism  No date: Osteoarthritis        Review of Systems    Review of Systems   Constitutional: Negative for unexpected weight change. HENT: Negative for congestion and postnasal drip. Respiratory: Negative for cough and shortness of breath. Cardiovascular: Negative for chest pain. Gastrointestinal: Negative for abdominal pain and blood in stool. Genitourinary: Positive for frequency.  Negative for hematuria. Musculoskeletal: Positive for arthralgias. Neurological: Negative for tremors and headaches. Psychiatric/Behavioral: Positive for confusion. Objective:   Physical Exam      Physical Exam   Constitutional: She is oriented to person, place, and time. HENT:   Head: Normocephalic. Mouth/Throat: Oropharynx is clear and moist.   Eyes: Conjunctivae are normal. No scleral icterus. Neck: Neck supple. Carotid bruit is not present. Cardiovascular: Normal rate, regular rhythm and normal heart sounds. Pulmonary/Chest: Effort normal and breath sounds normal.   Abdominal: Soft. Bowel sounds are normal. She exhibits no distension and no mass. There is no tenderness. Musculoskeletal: She exhibits no edema. Good rom both knees-no effusions   Lymphadenopathy:     She has no cervical adenopathy. Neurological: She is alert and oriented to person, place, and time. Skin: Skin is warm and dry. Psychiatric: She has a normal mood and affect. Her behavior is normal.       Assessment:       Diagnosis Orders   1. Chronic pain syndrome     2. Essential hypertension     3. Primary osteoarthritis involving multiple joints           Plan:      Calvin Mojica was seen today for abstract.     Diagnoses and all orders for this visit:    Chronic pain syndrome  Increase pain med to 2 at night  Essential hypertension  Continue meds-STEVEN diet  Primary osteoarthritis involving multiple joints  As above     See me 4 mos     Brandan Bradshaw, DO

## 2019-04-04 NOTE — PATIENT INSTRUCTIONS
hronic pain syndrome  Increase pain med to 2 at night  Essential hypertension  Continue meds-STEVEN diet  Primary osteoarthritis involving multiple joints  As above     See me 4 mos

## 2019-04-15 ENCOUNTER — TELEPHONE (OUTPATIENT)
Dept: FAMILY MEDICINE CLINIC | Age: 84
End: 2019-04-15

## 2019-04-15 NOTE — TELEPHONE ENCOUNTER
My instructions were 2 at night-none during the day b/c pt stated no pain during the day when she can move her legs.

## 2019-04-16 DIAGNOSIS — G89.4 CHRONIC PAIN SYNDROME: ICD-10-CM

## 2019-04-16 RX ORDER — OXYCODONE HYDROCHLORIDE AND ACETAMINOPHEN 5; 325 MG/1; MG/1
1 TABLET ORAL SEE ADMIN INSTRUCTIONS
Qty: 90 TABLET | Refills: 0 | Status: ON HOLD | OUTPATIENT
Start: 2019-04-16 | End: 2019-05-17

## 2019-04-16 RX ORDER — NALOXONE HYDROCHLORIDE 4 MG/.1ML
1 SPRAY NASAL PRN
Qty: 1 EACH | Refills: 5 | Status: ON HOLD | OUTPATIENT
Start: 2019-04-16 | End: 2019-05-16

## 2019-04-16 RX ORDER — OXYCODONE HYDROCHLORIDE AND ACETAMINOPHEN 5; 325 MG/1; MG/1
1 TABLET ORAL 2 TIMES DAILY PRN
Qty: 60 TABLET | Refills: 0 | Status: CANCELLED | OUTPATIENT
Start: 2019-04-16 | End: 2019-05-16

## 2019-04-16 NOTE — TELEPHONE ENCOUNTER
Spoke to pt daughter felicia she said that pt is out of refill and she states that if pt start hollering and screaming about her legs hurting she will bring her back to the office even if she has to sit in the office. A refill request was sent and pended.

## 2019-05-15 ENCOUNTER — HOSPITAL ENCOUNTER (INPATIENT)
Age: 84
LOS: 2 days | Discharge: HOME | DRG: 871 | End: 2019-05-17
Attending: EMERGENCY MEDICINE | Admitting: INTERNAL MEDICINE
Payer: MEDICARE

## 2019-05-15 ENCOUNTER — APPOINTMENT (OUTPATIENT)
Dept: GENERAL RADIOLOGY | Age: 84
DRG: 871 | End: 2019-05-15
Payer: MEDICARE

## 2019-05-15 ENCOUNTER — APPOINTMENT (OUTPATIENT)
Dept: CT IMAGING | Age: 84
DRG: 871 | End: 2019-05-15
Payer: MEDICARE

## 2019-05-15 DIAGNOSIS — J96.00 ACUTE RESPIRATORY FAILURE, UNSPECIFIED WHETHER WITH HYPOXIA OR HYPERCAPNIA (HCC): ICD-10-CM

## 2019-05-15 DIAGNOSIS — R65.20 SEVERE SEPSIS (HCC): Primary | ICD-10-CM

## 2019-05-15 DIAGNOSIS — A41.9 SEVERE SEPSIS (HCC): Primary | ICD-10-CM

## 2019-05-15 DIAGNOSIS — G89.4 CHRONIC PAIN SYNDROME: ICD-10-CM

## 2019-05-15 DIAGNOSIS — J69.0 ASPIRATION PNEUMONIA OF LOWER LOBE, UNSPECIFIED ASPIRATION PNEUMONIA TYPE, UNSPECIFIED LATERALITY (HCC): ICD-10-CM

## 2019-05-15 LAB
A/G RATIO: 0.8 (ref 1.1–2.2)
ALBUMIN SERPL-MCNC: 3.6 G/DL (ref 3.4–5)
ALP BLD-CCNC: 121 U/L (ref 40–129)
ALT SERPL-CCNC: 19 U/L (ref 10–40)
AMORPHOUS: ABNORMAL /HPF
ANION GAP SERPL CALCULATED.3IONS-SCNC: 14 MMOL/L (ref 3–16)
AST SERPL-CCNC: 40 U/L (ref 15–37)
BACTERIA: ABNORMAL /HPF
BASE EXCESS VENOUS: -8.5 MMOL/L (ref -3–3)
BASOPHILS ABSOLUTE: 0 K/UL (ref 0–0.2)
BASOPHILS RELATIVE PERCENT: 0.3 %
BILIRUB SERPL-MCNC: 0.6 MG/DL (ref 0–1)
BILIRUBIN URINE: NEGATIVE
BLOOD, URINE: ABNORMAL
BUN BLDV-MCNC: 14 MG/DL (ref 7–20)
CALCIUM SERPL-MCNC: 9.1 MG/DL (ref 8.3–10.6)
CARBOXYHEMOGLOBIN: 3.3 % (ref 0–1.5)
CASTS: ABNORMAL /LPF
CHLORIDE BLD-SCNC: 92 MMOL/L (ref 99–110)
CLARITY: CLEAR
CO2: 23 MMOL/L (ref 21–32)
COLOR: YELLOW
CREAT SERPL-MCNC: 1.2 MG/DL (ref 0.6–1.2)
CRYSTALS, UA: ABNORMAL /HPF
EOSINOPHILS ABSOLUTE: 0.1 K/UL (ref 0–0.6)
EOSINOPHILS RELATIVE PERCENT: 0.4 %
EPITHELIAL CELLS, UA: ABNORMAL /HPF
GFR AFRICAN AMERICAN: 52
GFR NON-AFRICAN AMERICAN: 43
GLOBULIN: 4.6 G/DL
GLUCOSE BLD-MCNC: 143 MG/DL (ref 70–99)
GLUCOSE URINE: NEGATIVE MG/DL
HCO3 VENOUS: 14 MMOL/L (ref 23–29)
HCT VFR BLD CALC: 34.7 % (ref 36–48)
HEMOGLOBIN: 11.5 G/DL (ref 12–16)
KETONES, URINE: NEGATIVE MG/DL
LACTIC ACID, SEPSIS: 0.7 MMOL/L (ref 0.4–1.9)
LACTIC ACID: 2.3 MMOL/L (ref 0.4–2)
LEUKOCYTE ESTERASE, URINE: NEGATIVE
LYMPHOCYTES ABSOLUTE: 1.2 K/UL (ref 1–5.1)
LYMPHOCYTES RELATIVE PERCENT: 8.3 %
MCH RBC QN AUTO: 32.9 PG (ref 26–34)
MCHC RBC AUTO-ENTMCNC: 33.1 G/DL (ref 31–36)
MCV RBC AUTO: 99.3 FL (ref 80–100)
METHEMOGLOBIN VENOUS: 0.6 %
MICROSCOPIC EXAMINATION: YES
MONOCYTES ABSOLUTE: 1.1 K/UL (ref 0–1.3)
MONOCYTES RELATIVE PERCENT: 7.7 %
NEUTROPHILS ABSOLUTE: 12.3 K/UL (ref 1.7–7.7)
NEUTROPHILS RELATIVE PERCENT: 83.3 %
NITRITE, URINE: NEGATIVE
O2 CONTENT, VEN: 11 VOL %
O2 SAT, VEN: 99 %
O2 THERAPY: ABNORMAL
PCO2, VEN: 19.9 MMHG (ref 40–50)
PDW BLD-RTO: 13 % (ref 12.4–15.4)
PH UA: 5.5 (ref 5–8)
PH VENOUS: 7.47 (ref 7.35–7.45)
PLATELET # BLD: 452 K/UL (ref 135–450)
PMV BLD AUTO: 8.4 FL (ref 5–10.5)
PO2, VEN: 123.6 MMHG (ref 25–40)
POTASSIUM REFLEX MAGNESIUM: 4.8 MMOL/L (ref 3.5–5.1)
PRO-BNP: 670 PG/ML (ref 0–449)
PROCALCITONIN: 0.18 NG/ML (ref 0–0.15)
PROTEIN UA: 100 MG/DL
RBC # BLD: 3.49 M/UL (ref 4–5.2)
RBC UA: ABNORMAL /HPF (ref 0–2)
SODIUM BLD-SCNC: 129 MMOL/L (ref 136–145)
SPECIFIC GRAVITY UA: 1.02 (ref 1–1.03)
TCO2 CALC VENOUS: 15 MMOL/L
TOTAL PROTEIN: 8.2 G/DL (ref 6.4–8.2)
TROPONIN: <0.01 NG/ML
URINE REFLEX TO CULTURE: ABNORMAL
URINE TYPE: ABNORMAL
UROBILINOGEN, URINE: 0.2 E.U./DL
WBC # BLD: 14.8 K/UL (ref 4–11)
WBC UA: ABNORMAL /HPF (ref 0–5)

## 2019-05-15 PROCEDURE — 71260 CT THORAX DX C+: CPT

## 2019-05-15 PROCEDURE — 85025 COMPLETE CBC W/AUTO DIFF WBC: CPT

## 2019-05-15 PROCEDURE — 84145 PROCALCITONIN (PCT): CPT

## 2019-05-15 PROCEDURE — 2580000003 HC RX 258: Performed by: PHYSICIAN ASSISTANT

## 2019-05-15 PROCEDURE — 84484 ASSAY OF TROPONIN QUANT: CPT

## 2019-05-15 PROCEDURE — 87205 SMEAR GRAM STAIN: CPT

## 2019-05-15 PROCEDURE — 87040 BLOOD CULTURE FOR BACTERIA: CPT

## 2019-05-15 PROCEDURE — 96365 THER/PROPH/DIAG IV INF INIT: CPT

## 2019-05-15 PROCEDURE — 87186 SC STD MICRODIL/AGAR DIL: CPT

## 2019-05-15 PROCEDURE — 96375 TX/PRO/DX INJ NEW DRUG ADDON: CPT

## 2019-05-15 PROCEDURE — 87070 CULTURE OTHR SPECIMN AEROBIC: CPT

## 2019-05-15 PROCEDURE — 93005 ELECTROCARDIOGRAM TRACING: CPT | Performed by: EMERGENCY MEDICINE

## 2019-05-15 PROCEDURE — 71046 X-RAY EXAM CHEST 2 VIEWS: CPT

## 2019-05-15 PROCEDURE — 80053 COMPREHEN METABOLIC PANEL: CPT

## 2019-05-15 PROCEDURE — 2060000000 HC ICU INTERMEDIATE R&B

## 2019-05-15 PROCEDURE — 6360000004 HC RX CONTRAST MEDICATION: Performed by: PHYSICIAN ASSISTANT

## 2019-05-15 PROCEDURE — 96367 TX/PROPH/DG ADDL SEQ IV INF: CPT

## 2019-05-15 PROCEDURE — 83605 ASSAY OF LACTIC ACID: CPT

## 2019-05-15 PROCEDURE — 96366 THER/PROPH/DIAG IV INF ADDON: CPT

## 2019-05-15 PROCEDURE — 6360000002 HC RX W HCPCS: Performed by: PHYSICIAN ASSISTANT

## 2019-05-15 PROCEDURE — 82803 BLOOD GASES ANY COMBINATION: CPT

## 2019-05-15 PROCEDURE — 96361 HYDRATE IV INFUSION ADD-ON: CPT

## 2019-05-15 PROCEDURE — 83880 ASSAY OF NATRIURETIC PEPTIDE: CPT

## 2019-05-15 PROCEDURE — 6370000000 HC RX 637 (ALT 250 FOR IP): Performed by: PHYSICIAN ASSISTANT

## 2019-05-15 PROCEDURE — 99285 EMERGENCY DEPT VISIT HI MDM: CPT

## 2019-05-15 PROCEDURE — 81001 URINALYSIS AUTO W/SCOPE: CPT

## 2019-05-15 PROCEDURE — 87077 CULTURE AEROBIC IDENTIFY: CPT

## 2019-05-15 RX ORDER — 0.9 % SODIUM CHLORIDE 0.9 %
1000 INTRAVENOUS SOLUTION INTRAVENOUS ONCE
Status: DISCONTINUED | OUTPATIENT
Start: 2019-05-15 | End: 2019-05-15

## 2019-05-15 RX ORDER — IPRATROPIUM BROMIDE AND ALBUTEROL SULFATE 2.5; .5 MG/3ML; MG/3ML
1 SOLUTION RESPIRATORY (INHALATION) ONCE
Status: COMPLETED | OUTPATIENT
Start: 2019-05-15 | End: 2019-05-15

## 2019-05-15 RX ORDER — ACETAMINOPHEN 500 MG
500 TABLET ORAL ONCE
Status: COMPLETED | OUTPATIENT
Start: 2019-05-15 | End: 2019-05-15

## 2019-05-15 RX ORDER — 0.9 % SODIUM CHLORIDE 0.9 %
30 INTRAVENOUS SOLUTION INTRAVENOUS ONCE
Status: COMPLETED | OUTPATIENT
Start: 2019-05-15 | End: 2019-05-15

## 2019-05-15 RX ORDER — 0.9 % SODIUM CHLORIDE 0.9 %
1000 INTRAVENOUS SOLUTION INTRAVENOUS ONCE
Status: DISCONTINUED | OUTPATIENT
Start: 2019-05-15 | End: 2019-05-17 | Stop reason: HOSPADM

## 2019-05-15 RX ORDER — METHYLPREDNISOLONE SODIUM SUCCINATE 125 MG/2ML
125 INJECTION, POWDER, LYOPHILIZED, FOR SOLUTION INTRAMUSCULAR; INTRAVENOUS ONCE
Status: COMPLETED | OUTPATIENT
Start: 2019-05-15 | End: 2019-05-15

## 2019-05-15 RX ADMIN — AZITHROMYCIN MONOHYDRATE 500 MG: 500 INJECTION, POWDER, LYOPHILIZED, FOR SOLUTION INTRAVENOUS at 19:38

## 2019-05-15 RX ADMIN — IPRATROPIUM BROMIDE AND ALBUTEROL SULFATE 1 AMPULE: .5; 3 SOLUTION RESPIRATORY (INHALATION) at 19:01

## 2019-05-15 RX ADMIN — CEFTRIAXONE SODIUM 1 G: 1 INJECTION, POWDER, FOR SOLUTION INTRAMUSCULAR; INTRAVENOUS at 19:01

## 2019-05-15 RX ADMIN — SODIUM CHLORIDE 1293 ML: 9 INJECTION, SOLUTION INTRAVENOUS at 19:01

## 2019-05-15 RX ADMIN — METHYLPREDNISOLONE SODIUM SUCCINATE 125 MG: 125 INJECTION, POWDER, FOR SOLUTION INTRAMUSCULAR; INTRAVENOUS at 19:01

## 2019-05-15 RX ADMIN — ACETAMINOPHEN 500 MG: 500 TABLET ORAL at 19:01

## 2019-05-15 RX ADMIN — IOPAMIDOL 85 ML: 755 INJECTION, SOLUTION INTRAVENOUS at 20:54

## 2019-05-15 ASSESSMENT — PAIN SCALES - GENERAL: PAINLEVEL_OUTOF10: 0

## 2019-05-15 NOTE — ED NOTES
Pt transported to radiology, EKG will be completed upon arrival back to room.       Maximo Coulter RN  05/15/19 [id number]

## 2019-05-16 LAB
ANION GAP SERPL CALCULATED.3IONS-SCNC: 10 MMOL/L (ref 3–16)
BASOPHILS ABSOLUTE: 0.1 K/UL (ref 0–0.2)
BASOPHILS RELATIVE PERCENT: 0.6 %
BUN BLDV-MCNC: 13 MG/DL (ref 7–20)
CALCIUM SERPL-MCNC: 8.4 MG/DL (ref 8.3–10.6)
CHLORIDE BLD-SCNC: 100 MMOL/L (ref 99–110)
CO2: 22 MMOL/L (ref 21–32)
CREAT SERPL-MCNC: 1 MG/DL (ref 0.6–1.2)
EKG ATRIAL RATE: 92 BPM
EKG DIAGNOSIS: NORMAL
EKG P AXIS: -4 DEGREES
EKG P-R INTERVAL: 130 MS
EKG Q-T INTERVAL: 350 MS
EKG QRS DURATION: 82 MS
EKG QTC CALCULATION (BAZETT): 432 MS
EKG R AXIS: 5 DEGREES
EKG T AXIS: -8 DEGREES
EKG VENTRICULAR RATE: 92 BPM
EOSINOPHILS ABSOLUTE: 0 K/UL (ref 0–0.6)
EOSINOPHILS RELATIVE PERCENT: 0 %
GFR AFRICAN AMERICAN: >60
GFR NON-AFRICAN AMERICAN: 53
GLUCOSE BLD-MCNC: 191 MG/DL (ref 70–99)
HCT VFR BLD CALC: 30.6 % (ref 36–48)
HEMOGLOBIN: 10.5 G/DL (ref 12–16)
LYMPHOCYTES ABSOLUTE: 0.8 K/UL (ref 1–5.1)
LYMPHOCYTES RELATIVE PERCENT: 8.1 %
MCH RBC QN AUTO: 34.3 PG (ref 26–34)
MCHC RBC AUTO-ENTMCNC: 34.2 G/DL (ref 31–36)
MCV RBC AUTO: 100.3 FL (ref 80–100)
MONOCYTES ABSOLUTE: 0.2 K/UL (ref 0–1.3)
MONOCYTES RELATIVE PERCENT: 1.5 %
NEUTROPHILS ABSOLUTE: 9.2 K/UL (ref 1.7–7.7)
NEUTROPHILS RELATIVE PERCENT: 89.8 %
PDW BLD-RTO: 12.9 % (ref 12.4–15.4)
PLATELET # BLD: 345 K/UL (ref 135–450)
PMV BLD AUTO: 8.7 FL (ref 5–10.5)
POTASSIUM REFLEX MAGNESIUM: 4.7 MMOL/L (ref 3.5–5.1)
RBC # BLD: 3.06 M/UL (ref 4–5.2)
SODIUM BLD-SCNC: 132 MMOL/L (ref 136–145)
WBC # BLD: 10.2 K/UL (ref 4–11)

## 2019-05-16 PROCEDURE — 36415 COLL VENOUS BLD VENIPUNCTURE: CPT

## 2019-05-16 PROCEDURE — 94761 N-INVAS EAR/PLS OXIMETRY MLT: CPT

## 2019-05-16 PROCEDURE — 97530 THERAPEUTIC ACTIVITIES: CPT

## 2019-05-16 PROCEDURE — 6360000002 HC RX W HCPCS: Performed by: INTERNAL MEDICINE

## 2019-05-16 PROCEDURE — 85025 COMPLETE CBC W/AUTO DIFF WBC: CPT

## 2019-05-16 PROCEDURE — 6370000000 HC RX 637 (ALT 250 FOR IP): Performed by: INTERNAL MEDICINE

## 2019-05-16 PROCEDURE — 94669 MECHANICAL CHEST WALL OSCILL: CPT

## 2019-05-16 PROCEDURE — 99232 SBSQ HOSP IP/OBS MODERATE 35: CPT | Performed by: INTERNAL MEDICINE

## 2019-05-16 PROCEDURE — 99222 1ST HOSP IP/OBS MODERATE 55: CPT | Performed by: INTERNAL MEDICINE

## 2019-05-16 PROCEDURE — 93010 ELECTROCARDIOGRAM REPORT: CPT | Performed by: INTERNAL MEDICINE

## 2019-05-16 PROCEDURE — 6370000000 HC RX 637 (ALT 250 FOR IP): Performed by: PHYSICIAN ASSISTANT

## 2019-05-16 PROCEDURE — 97116 GAIT TRAINING THERAPY: CPT

## 2019-05-16 PROCEDURE — 97535 SELF CARE MNGMENT TRAINING: CPT

## 2019-05-16 PROCEDURE — 80048 BASIC METABOLIC PNL TOTAL CA: CPT

## 2019-05-16 PROCEDURE — 97165 OT EVAL LOW COMPLEX 30 MIN: CPT

## 2019-05-16 PROCEDURE — 2500000003 HC RX 250 WO HCPCS: Performed by: INTERNAL MEDICINE

## 2019-05-16 PROCEDURE — 92526 ORAL FUNCTION THERAPY: CPT

## 2019-05-16 PROCEDURE — 92610 EVALUATE SWALLOWING FUNCTION: CPT

## 2019-05-16 PROCEDURE — 2580000003 HC RX 258: Performed by: INTERNAL MEDICINE

## 2019-05-16 PROCEDURE — 2060000000 HC ICU INTERMEDIATE R&B

## 2019-05-16 PROCEDURE — 97161 PT EVAL LOW COMPLEX 20 MIN: CPT

## 2019-05-16 RX ORDER — LEVOFLOXACIN 750 MG/1
375 TABLET ORAL DAILY
Status: DISCONTINUED | OUTPATIENT
Start: 2019-05-17 | End: 2019-05-17 | Stop reason: HOSPADM

## 2019-05-16 RX ORDER — SODIUM CHLORIDE 0.9 % (FLUSH) 0.9 %
5 SYRINGE (ML) INJECTION
Status: DISCONTINUED | OUTPATIENT
Start: 2019-05-16 | End: 2019-05-17

## 2019-05-16 RX ORDER — SODIUM CHLORIDE 9 MG/ML
INJECTION, SOLUTION INTRAVENOUS CONTINUOUS
Status: DISCONTINUED | OUTPATIENT
Start: 2019-05-16 | End: 2019-05-17 | Stop reason: HOSPADM

## 2019-05-16 RX ORDER — GUAIFENESIN 600 MG/1
600 TABLET, EXTENDED RELEASE ORAL 2 TIMES DAILY
Status: DISCONTINUED | OUTPATIENT
Start: 2019-05-16 | End: 2019-05-17 | Stop reason: HOSPADM

## 2019-05-16 RX ORDER — PANTOPRAZOLE SODIUM 40 MG/10ML
40 INJECTION, POWDER, LYOPHILIZED, FOR SOLUTION INTRAVENOUS DAILY
Status: DISCONTINUED | OUTPATIENT
Start: 2019-05-16 | End: 2019-05-16 | Stop reason: CLARIF

## 2019-05-16 RX ORDER — ALBUTEROL SULFATE 2.5 MG/3ML
2.5 SOLUTION RESPIRATORY (INHALATION)
Status: DISCONTINUED | OUTPATIENT
Start: 2019-05-16 | End: 2019-05-17 | Stop reason: HOSPADM

## 2019-05-16 RX ORDER — MORPHINE SULFATE 4 MG/ML
2 INJECTION, SOLUTION INTRAMUSCULAR; INTRAVENOUS EVERY 4 HOURS PRN
Status: DISCONTINUED | OUTPATIENT
Start: 2019-05-16 | End: 2019-05-17

## 2019-05-16 RX ORDER — SODIUM CHLORIDE 0.9 % (FLUSH) 0.9 %
10 SYRINGE (ML) INJECTION EVERY 12 HOURS SCHEDULED
Status: DISCONTINUED | OUTPATIENT
Start: 2019-05-16 | End: 2019-05-17 | Stop reason: HOSPADM

## 2019-05-16 RX ORDER — ESOMEPRAZOLE SODIUM 40 MG/5ML
40 INJECTION INTRAVENOUS
Status: DISCONTINUED | OUTPATIENT
Start: 2019-05-16 | End: 2019-05-17

## 2019-05-16 RX ORDER — IPRATROPIUM BROMIDE AND ALBUTEROL SULFATE 2.5; .5 MG/3ML; MG/3ML
1 SOLUTION RESPIRATORY (INHALATION)
Status: DISCONTINUED | OUTPATIENT
Start: 2019-05-16 | End: 2019-05-16

## 2019-05-16 RX ORDER — IPRATROPIUM BROMIDE AND ALBUTEROL SULFATE 2.5; .5 MG/3ML; MG/3ML
1 SOLUTION RESPIRATORY (INHALATION) EVERY 4 HOURS PRN
Status: DISCONTINUED | OUTPATIENT
Start: 2019-05-16 | End: 2019-05-17 | Stop reason: HOSPADM

## 2019-05-16 RX ORDER — HYDROCORTISONE 0.5 %
CREAM (GRAM) TOPICAL 3 TIMES DAILY PRN
Status: DISCONTINUED | OUTPATIENT
Start: 2019-05-16 | End: 2019-05-17 | Stop reason: HOSPADM

## 2019-05-16 RX ORDER — AZITHROMYCIN 250 MG/1
250 TABLET, FILM COATED ORAL DAILY
Status: DISCONTINUED | OUTPATIENT
Start: 2019-05-17 | End: 2019-05-17 | Stop reason: HOSPADM

## 2019-05-16 RX ORDER — SODIUM CHLORIDE 0.9 % (FLUSH) 0.9 %
10 SYRINGE (ML) INJECTION PRN
Status: DISCONTINUED | OUTPATIENT
Start: 2019-05-16 | End: 2019-05-17 | Stop reason: HOSPADM

## 2019-05-16 RX ORDER — LEVOTHYROXINE SODIUM ANHYDROUS 100 UG/5ML
37.5 INJECTION, POWDER, LYOPHILIZED, FOR SOLUTION INTRAVENOUS
Status: DISCONTINUED | OUTPATIENT
Start: 2019-05-16 | End: 2019-05-17

## 2019-05-16 RX ORDER — 0.9 % SODIUM CHLORIDE 0.9 %
10 VIAL (ML) INJECTION DAILY
Status: DISCONTINUED | OUTPATIENT
Start: 2019-05-16 | End: 2019-05-16 | Stop reason: CLARIF

## 2019-05-16 RX ADMIN — ESOMEPRAZOLE SODIUM 40 MG: 40 INJECTION INTRAVENOUS at 05:47

## 2019-05-16 RX ADMIN — SODIUM CHLORIDE: 9 INJECTION, SOLUTION INTRAVENOUS at 01:35

## 2019-05-16 RX ADMIN — LEVOTHYROXINE SODIUM ANHYDROUS 37.5 MCG: 100 INJECTION, POWDER, LYOPHILIZED, FOR SOLUTION INTRAVENOUS at 05:45

## 2019-05-16 RX ADMIN — GUAIFENESIN 600 MG: 600 TABLET, EXTENDED RELEASE ORAL at 21:11

## 2019-05-16 RX ADMIN — Medication 10 ML: at 08:12

## 2019-05-16 RX ADMIN — Medication 10 ML: at 05:46

## 2019-05-16 RX ADMIN — MORPHINE SULFATE 2 MG: 4 INJECTION INTRAVENOUS at 09:11

## 2019-05-16 RX ADMIN — MENTHOL, METHYL SALICYLATE: 10; 15 CREAM TOPICAL at 10:05

## 2019-05-16 RX ADMIN — ENOXAPARIN SODIUM 40 MG: 40 INJECTION SUBCUTANEOUS at 08:11

## 2019-05-16 RX ADMIN — Medication 10 ML: at 21:11

## 2019-05-16 RX ADMIN — GUAIFENESIN 600 MG: 600 TABLET, EXTENDED RELEASE ORAL at 15:08

## 2019-05-16 RX ADMIN — MORPHINE SULFATE 2 MG: 4 INJECTION INTRAVENOUS at 01:35

## 2019-05-16 RX ADMIN — Medication 10 ML: at 01:34

## 2019-05-16 ASSESSMENT — PAIN DESCRIPTION - FREQUENCY: FREQUENCY: CONTINUOUS

## 2019-05-16 ASSESSMENT — PAIN DESCRIPTION - ORIENTATION: ORIENTATION: RIGHT;LEFT

## 2019-05-16 ASSESSMENT — PAIN DESCRIPTION - ONSET: ONSET: ON-GOING

## 2019-05-16 ASSESSMENT — PAIN DESCRIPTION - PAIN TYPE: TYPE: CHRONIC PAIN;NEUROPATHIC PAIN

## 2019-05-16 ASSESSMENT — PAIN SCALES - GENERAL
PAINLEVEL_OUTOF10: 7
PAINLEVEL_OUTOF10: 8

## 2019-05-16 ASSESSMENT — PAIN DESCRIPTION - DESCRIPTORS: DESCRIPTORS: ACHING;BURNING

## 2019-05-16 ASSESSMENT — PAIN DESCRIPTION - LOCATION: LOCATION: LEG

## 2019-05-16 NOTE — PROGRESS NOTES
goals include: Pt motivated, PLOF, Family Support and Pt cooperative  Barriers to achieving goals include:  Complexity of condition     Plan: To be seen 5 x/wk while in acute care setting for therapeutic exercises, bed mobility, transfers, dressing, bathing, family/patient education with adaptive equipment, breathing technique instruction.        Nicky Gabriel, OTR/L #[id number]      If patient discharges from this facility prior to next visit, this note will serve as the Discharge Summary

## 2019-05-16 NOTE — PROGRESS NOTES
Patient anxious, jittery, kicking legs, pain in legs, has to void, pain medications given, IVF started, assisted up to [facility], patient still anxious, will recheck. Skin warm and dry, no fever, oxygen level good on 2 liters.

## 2019-05-16 NOTE — PROGRESS NOTES
Inpatient Physical Therapy Evaluation and Treatment    Unit: PCU  Date:  2019  Patient Name:    Rose Rivas  Admitting diagnosis:  Severe sepsis ([address] [A41.9, R65.20]  Admit Date:  5/15/2019  Precautions/Restrictions/WB Status/ Lines/ Wounds/ Oxygen: fall risk, IV, bed/chair alarm, supplemental o2 (2L), telemetry and continuous pulse ox    Treatment Time:    Treatment Number:  1   Timed Code Treatment Minutes: 25 minutes  Total Treatment Minutes:  35  minutes    Patient Goals for Therapy: \" to go home today \"          Discharge Recommendations: Home w/  assist and home therapy  DME needs for discharge: Needs Met       Therapy recommendations for staff:   Assist of 1 with use of rolling walker (RW) for all transfers or ambulation to/from bathroom    Home Health S4 Level Recommendation:  Level 2 Social  AM-PAC Mobility Score    AM-PAC Inpatient Mobility Raw Score : 18       Preadmission Environment    Pt. Lives With Family and  Assist Available  (son & daughter-in-law)  Home environment:  one story home  Steps to enter first floor: approx. 3 Steps to enter and One Hand rail  Steps to second floor: N/A  Bathroom: Bath Tub Shower, Walk in [employer], [employer], Shower Chair  and Standard [employer]  Equipment owned: Armaan Bashir, Shower Chair and [facility]    Preadmission Status:  Pt. Able to drive: No  Pt Fully independent with ADLs: No (family assists with lower body), IND with feeding, IND with toileting  Pt. Required assistance from family for: Bathing, Cleaning, Cooking, Dressing and Laundry   Pt.  Fully independent for transfers and gait and walked with [place] Noun  History of falls No    Pain   Yes  Ratin /10  Location: hands, knees   Pain Medicine Status: Pain med requested and RN notified    Cognition    A&O Person , Place  and Situation (stated it was 56 and was unsure of the month)   Able to follow 2 step commands    Subjective  Patient lying supine in bed with no family present  Pt agreeable to this PT eval & tx. Upper Extremity ROM/Strength  Please see OT evaluation. Lower Extremity ROM / Strength    AROM WFL: Yes  ROM limitations:     Strength Assessment (measured on a 0-5 scale):  R LE   Quad   4   Ant Tib  5/5   Hamstring 4   Iliopsoas 3  L LE  Quad   4   Ant Tib  5/5   Hamstring 4   Iliopsoas 3    Lower Extremity Sensation    Impaired, reports nocturnal tingling in feet     Lower Extremity Proprioception:   WFL    Coordination and Tone  WFL, though did appear to be restless while lying in bed; c/o chronic pain in knees requiring cream to feel better     Balance  Static Sitting:  Good -    Tolerance: WFL  Dynamic Sitting:  Fair   Static Standing: Fair    Tolerance: WFL  Dynamic Standing: Poor    Bed Mobility   Supine to Sit:   SBA  Sit to Supine:  Not Tested  Rolling:   Not Tested  Scooting at EOB: SBA  Scooting to [facility]:  Not Tested    Transfer Training     Sit to stand:   CGA from EOB     Min A from toilet   Stand to sit:   CGA to chair     Min A to toilet   Bed to Chair:  Not Tested with use of N/A    Gait gait completed as indicated below  Distance:  20 ft x2  Deviations (firm surface/linoleum): decreased edith, Decreased step length, Decreased step height, step-to pattern and decreased stance time on LLE   Assistive Device Used:  rolling walker (RW)  Level of Assist: Min A  Comment: VC for improved use of RW    Stair Training deferred, pt unsafe/not appropriate to complete stairs at this time    Activity Tolerance   Pt completed therapy session with Pain and SOB noted w/activity  SpO2: >/=90% on 2L  HR:   BP:     Positioning Needs   Pt sitting up in chair, call light and needs in reach, alarm set and pillows placed for support/comfort    Exercises Initiated    N/A    Other  None.      Patient/Family Education   Pt educated on role of inpatient PT, POC, importance of continued activity, safety awareness, transfer techniques and calling for assist with mobility. Assessment  Pt seen for Physical Therapy evaluation in acute care setting. Pt demonstrated decreased Activity tolerance, Balance, Safety and Strength and decreased independence with Ambulation and Transfers. Pt is appropriate for acute PT to safely progress functional mobility prior to returning home. Goals : To be met in 3 visits:  1). Independent with LE Ex x 10 reps    To be met in 6 visits:  1). Supine to/from sit: Modified Independent  2). Sit to/from stand: Supervision  3). Bed to chair: SBA and with use of RW  4). Gait: Ambulate 50 ft  with  CGA  and use of rolling walker (RW)  5). Tolerate B LE exercises 3 sets of 10-15 reps  6). Ascend/descend 3 steps with CGA with use of One Hand rail and No AD. Rehabilitation Potential: Good  Strengths for achieving goals include:   PLOF, Family Support and Pt cooperative  Barriers to achieving goals include:    Weakness    Plan    To be seen 3-5 x / week  while in acute care setting for therapeutic exercises, bed mobility, transfers, progressive gait training, balance training, and family/patient education. Beronica Jaffe, PT, DPT #[id number]     If patient discharges from this facility prior to next visit, this note will serve as the Discharge Summary.

## 2019-05-16 NOTE — CARE COORDINATION
Case Management Assessment  Initial Evaluation    Date/Time of Evaluation: 5/16/2019 10:34 AM  Assessment Completed by: Mike Ann    Patient Name: Samy Canseco  YOB: 1935  Diagnosis: Severe sepsis ([address] [A41.9, R65.20]  Date / Time: 5/15/2019  6:00 PM  Admission status/Date: Inpatient 5/15/2019  Chart Reviewed: Yes      Patient Interviewed: Yes   Family Interviewed:  No      Hospitalization in the last 30 days:  No    Contacts  :   Joaquina Gamboa  Relationship to Patient:  son  Phone Number:   [phone]  Alternate Contact:   Elida Morin  Relationship to Patient:   daughter  Phone Number:  [phone]    Met with: patient and spoke to daughter in law/phone    Current PCP: Zaira Miner MD    Financial  [employer] Medicare  Precert required for SNF : Y       3 night stay required - N    ADLS  Support Systems: Children, Family Members  Transportation: family    Meal Preparation: family    Housing  Home Environment: Lives in single story home w/son and daughter in law +24/7 care/supervision  Steps: 2 into house/ramp  Plans to Return to Present Housing: Yes  Other Identified Issues: handicap accessible [address]  Currently active with [address] : No  Type of Home Care Services: None  Passport/Waiver : No  :                      Phone Number:    Passport/Waiver Services: NA          Durable Medical Equipment   DME Provider:   Equipment: Walker_X_Cane_X_RTS__ BSC_X_Shower Chair_X_  02__ HHN__ CPAP__  BiPap__  Hospital Bed_X_ W/C__X_ Other__________      Has Home O2 in place on admit:  No  Informed of need to bring portable home O2 tank on day of discharge for nursing to connect prior to leaving:   No  Verbalized agreement/Understanding:   No    Community Service Affiliation  Dialysis:  No    · Name:  · Location  · Dialysis Schedule:  · Phone:   · Fax:     Outpatient PT/OT: No    Cancer Center: No     CHF Clinic: No     Pulmonary Rehab: No  Pain Clinic: No  [facility]: No    Wound Clinic: No     Other: NA    DISCHARGE PLAN: Chart reviewed. Met with pt at bedside and explained the role of the CM. Plan: Return home handicap accessible home w/family. +24/7 supervision. Declines home PT/OT. +CM following. Explained Case Management role/services.

## 2019-05-16 NOTE — PROGRESS NOTES
Patient awake, up to bed side commode, good UOP, clear urine, states pain in legs present, asking for cream from home. Assisted back to bed, call light in reach, bed alarm on.

## 2019-05-16 NOTE — PROGRESS NOTES
Pulmonology consult called to Dr. Phyllis Joiner on call. Spoke with CARL BRADSHAW [id number].     Evins Seip PCA/MT  05/16/2019

## 2019-05-16 NOTE — PROGRESS NOTES
4 Eyes Skin Assessment     The patient is being assess for   Admission    I agree that 2 RN's have performed a thorough Head to Toe Skin Assessment on the patient. ALL assessment sites listed below have been assessed. Areas assessed by both nurses: Lakeshia Grubbs and Zeina Bryan RN  [x]   Head, Face, and Ears   [x]   Shoulders, Back, and Chest, Abdomen  [x]   Arms, Elbows, and Hands   [x]   Coccyx, Sacrum, and Ischium  [x]   Legs, Feet, and Heels        Stage 1 pressure areas to bilateral heals very slow to pepe. Preventative mipilex placed heels, coccyx, and spine. **SHARE this note so that the co-signing nurse is able to place an eSignature**    Co-signer eSignature: Electronically signed by Kim Covarrubias RN on 5/16/19 at 5:38 AM    Does the Patient have Skin Breakdown?   Yes LDA WOUND CARE was Initiated documentation include the Marcie-wound, Wound Assessment, Measurements, Dressing Treatment, Drainage, and Color\",          Tanner Prevention initiated:  Yes   Wound Care Orders initiated:  Yes      [id number]  nurse consulted for Pressure Injury (Stage 3,4, Unstageable, DTI, NWPT, Complex wounds)and New or Established Ostomies:  NA      Primary Nurse eSignature: Electronically signed by Wolf Brown RN on 5/16/19 at 1:08 AM

## 2019-05-16 NOTE — PROGRESS NOTES
Progress Note    Admit Date:  5/15/2019    80year-old female admitted with pneumonia and sepsis. Subjective:  Ms. Tea Frank is still feeling weak, but overall doing better. Oxygen saturation stable on 2 L. Decrease cough and shortness of breath. Vital signs stable. Afebrile now, lactic acidosis resolved      Objective:   /62   Pulse 80   Temp 97 °F (36.1 °C) (Oral)   Resp 22   Ht 5' (1.524 m)   Wt 102 lb 8 oz (46.5 kg)   SpO2 97%   BMI 20.02 kg/m²       Intake/Output Summary (Last 24 hours) at 5/16/2019 1136  Last data filed at 5/16/2019 0419  Gross per 24 hour   Intake 256 ml   Output 775 ml   Net -519 ml         Physical Exam:  General:  Awake, alert, NAD  Elderly , frail    Skin:  Warm and dry  Neck:  JVD absent. Neck supple  Chest:  Diminished breath sounds. No wheezes, rales or rhonchi. Cardiovascular:  RRR ,S1S2 normal  Abdomen:  Soft, non tender, non distended, BS +  Extremities:  No edema. Intact peripheral pulses. Brisk cap refill, < 2 secs  Neuro: non focal      Medications:   Scheduled Meds:   levothyroxine  37.5 mcg Intravenous QAM AC    sodium chloride flush  10 mL Intravenous 2 times per day    enoxaparin  40 mg Subcutaneous Daily    azithromycin  500 mg Intravenous Q24H    And    cefTRIAXone (ROCEPHIN) IV  1 g Intravenous Q24H    esomeprazole  40 mg Intravenous QAM AC    Or    sodium chloride flush  5 mL Intravenous QAM AC    sodium chloride  1,000 mL Intravenous Once       Continuous Infusions:   sodium chloride 100 mL/hr at 05/16/19 0135       Data:  CBC:   Recent Labs     05/15/19  1825 05/16/19  0447   WBC 14.8* 10.2   RBC 3.49* 3.06*   HGB 11.5* 10.5*   HCT 34.7* 30.6*   MCV 99.3 100.3*   RDW 13.0 12.9   * 345     BMP:   Recent Labs     05/15/19  1825 05/16/19  0447   * 132*   K 4.8 4.7   CL 92* 100   CO2 23 22   BUN 14 13   CREATININE 1.2 1.0     BNP: No results for input(s): BNP in the last 72 hours.   PT/INR: No results for input(s): PROTIME, INR in the last 72 hours. APTT: No results for input(s): APTT in the last 72 hours. CARDIAC ENZYMES:   Recent Labs     05/15/19  1825   TROPONINI <0.01     FASTING LIPID PANEL:  Lab Results   Component Value Date    CHOL 215 (H) 03/20/2012     03/20/2012    TRIG 88 03/20/2012     LIVER PROFILE:   Recent Labs     05/15/19  1825   AST 40*   ALT 19   BILITOT 0.6   ALKPHOS 121         radiology   CT CHEST PULMONARY EMBOLISM W CONTRAST   Final Result   Extensive respiratory motion within the lower lungs, limited evaluation of   the segmental and subsegmental pulmonary arterial branches within the lower   lungs. No central pulmonary embolism is detected. No upper lung pulmonary   embolism. There is bronchial wall thickening with debris in the lower lung airways,   especially on the right. Collapse and consolidation with the right lobe lobe   detected as well. Aspiration would be the primary consideration. XR CHEST STANDARD (2 VW)   Final Result   No acute disease. telemetry    normal sinus rhythm      ASSESSMENT/PLAN:    1. Sepsis,  present on admission   - Due to pneumonia . - Tmax was 102.8°F, with heart rate of 103 and respiratory rate of 34 and presentation. White count was elevated 14.8  - Improved now . Afebrile today. Heart rate stable white count down to 10.2.   -  Continue antibiotics as below . Decrease rate of IV fluids . Did not need pressors . 2. community-acquired pneumonia suspect strep pneumonia or other gram positive organism  Pulmonology consulted   -Continue Rocephin and Zithromax. - Follow up on cultures. - Hypoxia-on oxygen supplementation 2 L      3 . Hyponatremia  -  mild at 129, improved with hydration with normal saline and sodium level is 132 today . -Diarrhea improved    4. Lactic acidosis  -  resolved with IV hydration 2.3 -  0.7    DIET DENTAL SOFT; No Added Salt (3-4 GM);  Dental Soft   Full Code      Rey Rossi MD 5/16/2019 11:36

## 2019-05-16 NOTE — FLOWSHEET NOTE
05/16/19 1545   Vital Signs   Temp 97.5 °F (36.4 °C)   Temp Source Oral   Pulse 88   Heart Rate Source Monitor   Resp 16   /62   BP Location Right Arm   BP Upper/Lower Upper   Level of Consciousness 0   MEWS Score 1   Patient Currently in Pain Denies   Oxygen Therapy   SpO2 95 %   O2 Device None (Room air)   Patient up to chair ambulated with rolling walker to the bathroom. Patient forgetful at times needs reoriented.

## 2019-05-16 NOTE — PROGRESS NOTES
Pt sitting up in bed. Alert, oriented x4. Short term memory loss noted. Full assessment completed. See flowsheet. Pt denies pain/discomfort. Denies needs at this time. Possessions within reach.

## 2019-05-16 NOTE — ED PROVIDER NOTES
I independently performed a history and physical on Leena Mims. All diagnostic, treatment, and disposition decisions were made by myself in conjunction with the advanced practice provider. Briefly, this is a 80 y.o. female here for continued illness. Patient states that she has had a cough for at least 2 weeks, with productive sputum. On exam, patient was noted be tachycardic, tachypneic, and to be hypoxic. The patient had clinical signs that are consistent with severe sepsis, and there also appears to be pneumonia. The patient will be started antibiotics but will also require admission to the hospital.    Critical care time provided of 33 minutes, excluding any previously dictated procedures. This time. Her physical examination, left interpretation, medical intervention, frequent reassessments, bedside discussion, and charting. For further details of Donna Stahl emergency department encounter, please see documentation by advanced practice provider  Sonia Angel.        Adeola Delgado MD  05/16/19 2536

## 2019-05-16 NOTE — H&P
Hospital Medicine History & Physical      PCP: Rey Mims DO    Date of Service: Pt seen/examined on 5/15/19 and admitted on 5/15/19 to Inpatient    Chief Complaint   Patient presents with    Cough     Pt arrives with cough x 2 weeks. To days ago pt began feeling weak, no eating or drinking and fevers at home. History Of Present Illness: The patient is a 80 y.o. female with PMH below, presents with productive cough, decreased PO intake, generalized weakness. Family reports that she has been unwell since Easter. She has had a worsening cough over that time. Her cough has been increasingly productive of yellow green sputum. They report that she has declined significantly over the last 2 days. She has not been eating or drinking much, not moving around as much as usual and overall appears much more weak over that time. She is very hard of hearing. Past Medical History:        Diagnosis Date    Depression     Hyperlipidemia     Hypertension     Hypothyroidism     Osteoarthritis        Past Surgical History:        Procedure Laterality Date    INTERTROCHANTERIC HIP FRACTURE SURGERY  3/13    Lt    JOINT REPLACEMENT      Rt hip    JOINT REPLACEMENT      Rt knee       Medications Prior to Admission:    Prior to Admission medications    Medication Sig Start Date End Date Taking? Authorizing Provider   oxyCODONE-acetaminophen (PERCOCET) 5-325 MG per tablet Take 1 tablet by mouth See Admin Instructions for 30 days.  1 AM & 2 PM 4/16/19 5/16/19 Yes Brandan Bradshaw DO   naloxone 4 MG/0.1ML LIQD nasal spray 1 spray by Nasal route as needed for Opioid Reversal 4/16/19  Yes Brandan Bradshaw DO   gabapentin (NEURONTIN) 300 MG capsule TAKE 1 CAPSULE AT BEDTIME 3/21/19 6/19/19 Yes Brandan Bradshaw DO   enalapril (VASOTEC) 5 MG tablet TAKE 1 TABLET DAILY 10/11/18  Yes Brandan Bradshaw DO   levothyroxine (SYNTHROID) 75 MCG tablet TAKE 1 TABLET DAILY 10/11/18  Yes Brandan Bradshaw DO   Gabapentin, Once-Daily, 300 MG TABS 1 hs. 10/9/18 5/15/19 Yes Brandan Bradshaw DO   diphenhydrAMINE (BENADRYL) 25 MG capsule Take 25 mg by mouth every 6 hours as needed for Itching   Yes Historical Provider, MD   misoprostol (CYTOTEC) 200 MCG tablet TAKE 1 TABLET TWICE A DAY  Patient taking differently: TAKE 1 TABLET Once per day 10/16/17  Yes Brandan Bradshaw DO       Allergies:  Aspirin    Social History:    TOBACCO:   reports that she has never smoked. She has never used smokeless tobacco.  ETOH:   reports that she does not drink alcohol. Family History:  Reviewed in detail and negative for DM, Early CAD, Cancer (except as below). Positive as follows:    History reviewed. No pertinent family history. REVIEW OF SYSTEMS:   Pertinent positives/negatives as follows: productive cough, decreased PO intake, generalized weakness, and as discussed in HPI, otherwise a complete ROS performed and all other systems are negative  PHYSICAL EXAM PERFORMED:    BP (!) 98/50   Pulse 82   Temp 102.8 °F (39.3 °C) (Temporal)   Resp 23   Ht 5' (1.524 m)   Wt 95 lb (43.1 kg)   SpO2 95%   BMI 18.55 kg/m²     GEN:  Awake and alert, mild increased WOB. Warm to touch. Appears frail/cachectic. HEENT:  NC/AT,EOMI, dry MM, no erythema/exudates or visible masses. CVS:  Normal S1,S2. RRR. Sys murmer noted. Without G/R.   LUNG:   Bibasilar rales w/o R or W. Tachypnea. ABD:  Soft, ND/NT, BS+ x4. Without G/R.  EXT: 2+ pulses, no c/c/e. Brisk cap refill. PSY:  Thought process slowed, disoriented, affect pleasant. JOVANNA:  CN III-XII intact except she is very hard of hearing, moves all 4 spontaneously, sensory grossly intact. Loses train of thought  SKIN: No rash or lesions on visible skin.     Chart review shows recent radiographs:  Xr Chest Standard (2 Vw)    Result Date: 5/15/2019  EXAMINATION: TWO XRAY VIEWS OF THE CHEST 5/15/2019 6:36 pm COMPARISON: 04/19/2009 HISTORY: ORDERING SYSTEM PROVIDED HISTORY: cough TECHNOLOGIST PROVIDED HISTORY: Reason for exam:->cough Ordering Physician Provided Reason for Exam: cough History of hypertension, hyperlipidemia FINDINGS: The bones appear demineralized and show degenerative changes. There is evidence of prior augmentation upper lumbar vertebral body compression fractures with radiopaque cement. Heart size is within normal limits, stable. No focal consolidation, overt edema, nor effusion is suspected. No acute disease. Ct Chest Pulmonary Embolism W Contrast    Result Date: 5/15/2019  EXAMINATION: CTA OF THE CHEST 5/15/2019 5:55 pm TECHNIQUE: CTA of the chest was performed after the administration of intravenous contrast.  Multiplanar reformatted images are provided for review. MIP images are provided for review. Dose modulation, iterative reconstruction, and/or weight based adjustment of the mA/kV was utilized to reduce the radiation dose to as low as reasonably achievable. COMPARISON: None. HISTORY: ORDERING SYSTEM PROVIDED HISTORY: possible PE TECHNOLOGIST PROVIDED HISTORY: Ordering Physician Provided Reason for Exam: Cough (Pt arrives with cough x 2 weeks. To days ago pt began feeling weak, no eating or drinking and fevers at home.) Acuity: Acute Type of Exam: Initial FINDINGS: Pulmonary Arteries: There is extensive respiratory motion artifact within the lower lungs, limiting evaluation of the segmental and subsegmental pulmonary arterial branches within the lower lungs. No central pulmonary embolism is detected. No filling defects within the upper lungs are seen. Mediastinum: Thoracic aorta is normal in caliber without evidence of dissection. Shotty mediastinal lymph nodes are identified. Esophagus unremarkable. Lungs/pleura: There is bronchial wall thickening noted diffusely, but greater in the lower lungs. Debris within the lower lung airways is noted, especially on the right. Collapse and consolidation within the right lower lobe is detected. Upper Abdomen: Unremarkable.  Soft Tissues/Bones: Multilevel compression fractures are identified. Cement augmentation noted within the lower thoracic spine and upper lumbar spine. Extensive respiratory motion within the lower lungs, limited evaluation of the segmental and subsegmental pulmonary arterial branches within the lower lungs. No central pulmonary embolism is detected. No upper lung pulmonary embolism. There is bronchial wall thickening with debris in the lower lung airways, especially on the right. Collapse and consolidation with the right lobe lobe detected as well. Aspiration would be the primary consideration.      5/16/2019  4:38 AM - Cr, Tjh Incoming Muse Results     Component Value Ref Range & Units Status Collected Lab   Ventricular Rate 92  BPM Final 05/15/2019  7:12 PM 14   Atrial Rate 92  BPM Final 05/15/2019  7:12 PM 14   P-R Interval 130  ms Final 05/15/2019  7:12 PM 14   QRS Duration 82  ms Final 05/15/2019  7:12 PM 14   Q-T Interval 350  ms Final 05/15/2019  7:12 PM 14   QTc Calculation (Bazett) 432  ms Final 05/15/2019  7:12 PM 14   P Axis -4  degrees Final 05/15/2019  7:12 PM 14   R Axis 5  degrees Final 05/15/2019  7:12 PM 14   T Axis -8  degrees Final 05/15/2019  7:12 PM 14   Diagnosis   Final 05/15/2019  7:12 PM 14   Normal sinus rhythmNormal ECGConfirmed by Jabari Restrepo MD, [address] ([id number]) on 5/16/2019 4:38:00 AM          CBC:  Recent Labs     05/15/19  1825   WBC 14.8*   HGB 11.5*   HCT 34.7*   *      RENAL  Recent Labs     05/15/19  1825   *   K 4.8   CL 92*   CO2 23   BUN 14   CREATININE 1.2   GLUCOSE 143*     LFT'S:  Recent Labs     05/15/19  1825   AST 40*   ALT 19   BILITOT 0.6   ALKPHOS 121     CARDIAC ENZYMES:   Recent Labs     05/15/19  1825   TROPONINI <0.01     Lab Results   Component Value Date    PROBNP 670 (H) 05/15/2019    PROBNP 393 04/29/2016     LACTIC ACID:  Recent Labs     05/15/19  1825 05/15/19  2101   LACTA 2.3*  --    LACTSEPSIS  --  0.7     U/A:  Recent Labs     05/15/19  1935 LEUKOCYTESUR Negative   BACTERIA 2+*   WBCUA 3-5   COLORU Yellow   RBCUA 3-5*   CLARITYU Clear   SPECGRAV 1.025   BLOODU SMALL*   GLUCOSEU Negative   AMORPHOUS 1+*     PHYSICIAN CERTIFICATION    I certify that Dayron Birch is expected to be hospitalized for 2 midnights based on the following assessment and plan:    ASSESSMENT/PLAN:  1. Severe sepsis, likely related to #3, IV ceftriaxone and azithro, IVF, f/u cx. No need for pressors at this time. 2. Acute respiratory failure with hypoxia, likely related to , supplemental O2 to maintain SPO2 ? 92%, continuous pulse ox. Currently requiring 2L to maintain sats. Wean as tolerated. Pulm c/s. 3. CAP, bibasilar, high concern for aspiration, ABX as above. Nebs. 4. Hyponatremia, mild at 129, possibly related to poor PO intake and diarrhea. IVF and repeat in am.   5. Lactic acidosis, 2.3, 0.7, resolved w/ IVF. 6. Poor PO intake/dehydration, IVF, dietitian c/s.   7. Acute encephalopathy, baseline unclear. Supportive measures. DVT Prophylaxis: Lovenox  Diet: NPO pending SLP eval, advance per SLP recs  Code Status: Full Code   PT/OT Eval Status: Will order if needed and as patient condition allows  Dispo - Admit to inpatient PCU    José Antonio Daniel MD    Thank you Ben Cyr DO for the opportunity to be involved in this patient's care. If you have any questions or concerns please feel free to contact me via the [employer] at [phone]. This chart was generated using the [address] [place] [address] [employer] system. I created this record but it may contain dictation errors given the limitations of this technology.

## 2019-05-16 NOTE — ED PROVIDER NOTES
CHIEFCOMPLAINT   Cough (Pt arrives with cough x 2 weeks. To days ago pt began feeling weak, no eating or drinking and fevers at home.)      PATIENT INFORMATION  Renetta Brock is a 80 y.o. female who presents to the ED for evaluation by private vehicle for evaluation accompanied by son and daughter-in-law who help give history for evaluation of patient being \"sick since [place]". They state she has had a cough for 2 weeks time it is productive with purulent sputum. She is also having weakness and has stopped eating and drinking over the past several days she also is having fevers at home. Several days of diarrhea. Denies dysuria. Pt only complains of being cold. Gradual onset the symptoms which have been worsening since onset. They state that they had to \"drag\" her mother into the ED today for evaluation. +hard of hearing. Patient is not on oxygen at home. Patient lives at home with her son and daughter-in-law. Patient has a history of hypertension hypothyroidism irritable bowel syndrome arthritis osteoporosis mild dementia and anemia. I have reviewed the following from the nursing documentation, and I have confirmed the past medical history, medications, allergies, social history and family history with the patient. Past Medical History:   Diagnosis Date    Depression     Hyperlipidemia     Hypertension     Hypothyroidism     Osteoarthritis      Past Surgical History:   Procedure Laterality Date    INTERTROCHANTERIC HIP FRACTURE SURGERY  3/13    Lt    JOINT REPLACEMENT      Rt hip    JOINT REPLACEMENT      Rt knee     History reviewed. No pertinent family history. Social History     Socioeconomic History    Marital status:       Spouse name: Not on file    Number of children: Not on file    Years of education: Not on file    Highest education level: Not on file   Occupational History    Not on file   Social Needs    Financial resource strain: Not on file   Adelso-Chasity insecurity:     Worry: Not on file     Inability: Not on file    Transportation needs:     Medical: Not on file     Non-medical: Not on file   Tobacco Use    Smoking status: Never Smoker    Smokeless tobacco: Never Used   Substance and Sexual Activity    Alcohol use: No    Drug use: No    Sexual activity: Never   Lifestyle    Physical activity:     Days per week: Not on file     Minutes per session: Not on file    Stress: Not on file   Relationships    Social connections:     Talks on phone: Not on file     Gets together: Not on file     Attends Caodaism service: Not on file     Active member of club or organization: Not on file     Attends meetings of clubs or organizations: Not on file     Relationship status: Not on file    Intimate partner violence:     Fear of current or ex partner: Not on file     Emotionally abused: Not on file     Physically abused: Not on file     Forced sexual activity: Not on file   Other Topics Concern    Not on file   Social History Narrative    Not on file     Current Facility-Administered Medications   Medication Dose Route Frequency Provider Last Rate Last Dose    0.9 % sodium chloride bolus  1,000 mL Intravenous Once Natty Krishnan MD            Allergies   Allergen Reactions    Aspirin Other (See Comments)     Gastric pain       REVIEW OF SYSTEMS  Review of Systems  10 systems reviewed, pertinent positives per HPI otherwise noted to be negative. PHYSICAL EXAM  BP (!) 101/59   Pulse 84   Temp 99 °F (37.2 °C) (Oral)   Resp 28   Ht 5' (1.524 m)   Wt 95 lb (43.1 kg)   SpO2 97%   BMI 18.55 kg/m²  on NCO2, 2L  GENERALAPPEARANCE: Awake and alert. Cooperative. cachexia   HEAD: Normocephalic. Atraumatic. EYES: PERRL. EOM's grossly intact. No scleral injection or icterus. ENT: Mucous membranes are dry. No dentition. NECK: Supple. No tracheal deviation. HEART: RRR.  +murmur  LUNGS: Tachypneic, coarse rhonchi throughout, barrel chest, decreased movement to right LL.   ABDOMEN: Soft. Non-distended. Non-tender. No guarding or rebound. Normal bowel sounds. EXTREMITIES: No peripheral edema. All extremities neurovascularly intact. SKIN: Warm and dry. No acute rashes. +Pale, delayed skin turgor, changes consistent with age  NEUROLOGICAL: Alert and oriented. No gross facial drooping. Strength 5/5, sensation intact. Normal coordination. Gait is steady. PSYCHIATRIC: Normal mood and affect, mild forgetfulness, [facility]    LABS  I have reviewed all labs for this visit.    Results for orders placed or performed during the hospital encounter of 05/15/19   Respiratory Culture   Result Value Ref Range    Gram Stain Result       3+ WBC's (Polymorphonuclear)  3+ Gram positive cocci   No Epithelial Cells seen     CBC Auto Differential   Result Value Ref Range    WBC 14.8 (H) 4.0 - 11.0 K/uL    RBC 3.49 (L) 4.00 - 5.20 M/uL    Hemoglobin 11.5 (L) 12.0 - 16.0 g/dL    Hematocrit 34.7 (L) 36.0 - 48.0 %    MCV 99.3 80.0 - 100.0 fL    MCH 32.9 26.0 - 34.0 pg    MCHC 33.1 31.0 - 36.0 g/dL    RDW 13.0 12.4 - 15.4 %    Platelets 002 (H) 138 - 450 K/uL    MPV 8.4 5.0 - 10.5 fL    Neutrophils % 83.3 %    Lymphocytes % 8.3 %    Monocytes % 7.7 %    Eosinophils % 0.4 %    Basophils % 0.3 %    Neutrophils # 12.3 (H) 1.7 - 7.7 K/uL    Lymphocytes # 1.2 1.0 - 5.1 K/uL    Monocytes # 1.1 0.0 - 1.3 K/uL    Eosinophils # 0.1 0.0 - 0.6 K/uL    Basophils # 0.0 0.0 - 0.2 K/uL   Lactic Acid, Plasma   Result Value Ref Range    Lactic Acid 2.3 (H) 0.4 - 2.0 mmol/L   Urine, reflex to culture   Result Value Ref Range    Color, UA Yellow Straw/Yellow    Clarity, UA Clear Clear    Glucose, Ur Negative Negative mg/dL    Bilirubin Urine Negative Negative    Ketones, Urine Negative Negative mg/dL    Specific Gravity, UA 1.025 1.005 - 1.030    Blood, Urine SMALL (A) Negative    pH, UA 5.5 5.0 - 8.0    Protein,  (A) Negative mg/dL    Urobilinogen, Urine 0.2 <2.0 E.U./dL    Nitrite, Urine Negative Negative    Leukocyte Esterase, Urine Negative Negative    Microscopic Examination YES     Urine Reflex to Culture Not Indicated     Urine Type Not Specified    Lactate, Sepsis   Result Value Ref Range    Lactic Acid, Sepsis 0.7 0.4 - 1.9 mmol/L   Comprehensive Metabolic Panel w/ Reflex to MG   Result Value Ref Range    Sodium 129 (L) 136 - 145 mmol/L    Potassium reflex Magnesium 4.8 3.5 - 5.1 mmol/L    Chloride 92 (L) 99 - 110 mmol/L    CO2 23 21 - 32 mmol/L    Anion Gap 14 3 - 16    Glucose 143 (H) 70 - 99 mg/dL    BUN 14 7 - 20 mg/dL    CREATININE 1.2 0.6 - 1.2 mg/dL    GFR Non- 43 (A) >60    GFR  52 (A) >60    Calcium 9.1 8.3 - 10.6 mg/dL    Total Protein 8.2 6.4 - 8.2 g/dL    Alb 3.6 3.4 - 5.0 g/dL    Albumin/Globulin Ratio 0.8 (L) 1.1 - 2.2    Total Bilirubin 0.6 0.0 - 1.0 mg/dL    Alkaline Phosphatase 121 40 - 129 U/L    ALT 19 10 - 40 U/L    AST 40 (H) 15 - 37 U/L    Globulin 4.6 g/dL   Troponin   Result Value Ref Range    Troponin <0.01 <0.01 ng/mL   Brain Natriuretic Peptide   Result Value Ref Range    Pro- (H) 0 - 449 pg/mL   Procalcitonin   Result Value Ref Range    Procalcitonin 0.18 (H) 0.00 - 0.15 ng/mL   Microscopic Urinalysis   Result Value Ref Range    Casts 3-5 Fine Jaci Raker. (A) /LPF    WBC, UA 3-5 0 - 5 /HPF    RBC, UA 3-5 (A) 0 - 2 /HPF    Epi Cells 3-5 /HPF    Bacteria, UA 2+ (A) /HPF    Amorphous, UA 1+ (A) /HPF    Crystals Few Ca.  Oxalate (A) /HPF   Blood gas, venous   Result Value Ref Range    pH, Shon 7.466 (H) 7.350 - 7.450    pCO2, Shon 19.9 (L) 40.0 - 50.0 mmHg    pO2, Shon 123.6 (H) 25.0 - 40.0 mmHg    HCO3, Venous 14.0 (L) 23.0 - 29.0 mmol/L    Base Excess, Shon -8.5 (L) -3.0 - 3.0 mmol/L    O2 Sat, Shon 99 Not Established %    Carboxyhemoglobin 3.3 (H) 0.0 - 1.5 %    MetHgb, Shon 0.6 <1.5 %    TC02 (Calc), Shon 15 Not Established mmol/L    O2 Content, Shon 11 Not Established VOL %    O2 Therapy Unknown    EKG 12 Lead   Result Value Ref Range    Ventricular Rate 92 BPM    Atrial Rate 92 BPM    P-R Interval 130 ms    QRS Duration 82 ms    Q-T Interval 350 ms    QTc Calculation (Bazett) 432 ms    P Axis -4 degrees    R Axis 5 degrees    T Axis -8 degrees    Diagnosis       Normal sinus rhythmNormal ECGWhen compared with ECG of 29-APR-2016 14:12,Questionable change in QRS axisT wave inversion now evident in Inferior leads           RADIOLOGY    Xr Chest Standard (2 Vw)    Result Date: 5/15/2019  EXAMINATION: TWO XRAY VIEWS OF THE CHEST 5/15/2019 6:36 pm COMPARISON: 04/19/2009 HISTORY: ORDERING SYSTEM PROVIDED HISTORY: cough TECHNOLOGIST PROVIDED HISTORY: Reason for exam:->cough Ordering Physician Provided Reason for Exam: cough History of hypertension, hyperlipidemia FINDINGS: The bones appear demineralized and show degenerative changes. There is evidence of prior augmentation upper lumbar vertebral body compression fractures with radiopaque cement. Heart size is within normal limits, stable. No focal consolidation, overt edema, nor effusion is suspected. No acute disease. Ct Chest Pulmonary Embolism W Contrast    Result Date: 5/15/2019  EXAMINATION: CTA OF THE CHEST 5/15/2019 5:55 pm TECHNIQUE: CTA of the chest was performed after the administration of intravenous contrast.  Multiplanar reformatted images are provided for review. MIP images are provided for review. Dose modulation, iterative reconstruction, and/or weight based adjustment of the mA/kV was utilized to reduce the radiation dose to as low as reasonably achievable. COMPARISON: None. HISTORY: ORDERING SYSTEM PROVIDED HISTORY: possible PE TECHNOLOGIST PROVIDED HISTORY: Ordering Physician Provided Reason for Exam: Cough (Pt arrives with cough x 2 weeks. To days ago pt began feeling weak, no eating or drinking and fevers at home.) Acuity: Acute Type of Exam: Initial FINDINGS: Pulmonary Arteries:  There is extensive respiratory motion artifact within the lower lungs, limiting evaluation of the segmental and subsegmental pulmonary arterial branches within the lower lungs. No central pulmonary embolism is detected. No filling defects within the upper lungs are seen. Mediastinum: Thoracic aorta is normal in caliber without evidence of dissection. Shotty mediastinal lymph nodes are identified. Esophagus unremarkable. Lungs/pleura: There is bronchial wall thickening noted diffusely, but greater in the lower lungs. Debris within the lower lung airways is noted, especially on the right. Collapse and consolidation within the right lower lobe is detected. Upper Abdomen: Unremarkable. Soft Tissues/Bones: Multilevel compression fractures are identified. Cement augmentation noted within the lower thoracic spine and upper lumbar spine. Extensive respiratory motion within the lower lungs, limited evaluation of the segmental and subsegmental pulmonary arterial branches within the lower lungs. No central pulmonary embolism is detected. No upper lung pulmonary embolism. There is bronchial wall thickening with debris in the lower lung airways, especially on the right. Collapse and consolidation with the right lobe lobe detected as well. Aspiration would be the primary consideration. CONSULTATIONS  Hospitalist, agrees to admit in stable condition. ED COURSE/MDM  Febrile tachycardia, tachyepnia, patient presents to the ED with tachycardia and hypoxia. Family at bedside. Patient has increased work of breathing. Breathing very fast.  Concern for possible pneumonia as patient has coarse rhonchi throughout and decreased breath sounds to the right lower lobe. Chest x-ray is ordered in addition to breathing treatments and Solu-Medrol. Patient is given IV fluids and antibiotics directed sepsis with possible community-acquired pneumonia as the source. Patient seen and evaluated. Discussed H&P with supervising physician, aware of results and agrees w plan/disposition. Dr Hunter Austin.    I have seen and evaluated this patient with supervising physician. Old records reviewed. Labs and imaging reviewed andresults discussed. Patient's labs returned revealing venous pH is 7.46 PCO2 of 19.9 carboxyhemoglobin of 3.3. Patient's labs also revealed leukocytosis with a white blood cell count 14.8 mild anemia with a hemoglobin of 11.5. Increased platelets of 100. Elevated pro-calcitonin 0.18. As chest x-ray shows no acute findings a CT of the chest is ordered to rule out a PE causing patient's symptoms. DT findings show no evidence of PE however bronchial wall thickening and debris in the lower lung airways worse on the right. Along with collapse and consolidation in the right lobe. Concern for aspiration pneumonia. This would be consistent with patient's presentation. Feel patient would benefit from admission. I do have end-of-life discussion with patient and family at bedside. Family produces medical power of  paperwork indicating son is medical power of  and a living will. Patient states that she is a full code but does not want to live on a machine forever.   Patient is agreeable to admission in fair condition  Patient was given the following medications in the ED:  Medications   0.9 % sodium chloride bolus (has no administration in time range)   cefTRIAXone (ROCEPHIN) 1 g IVPB in 50 mL D5W minibag (0 g Intravenous Stopped 5/15/19 1938)   azithromycin (ZITHROMAX) 500 mg in D5W 250ml addavial (0 mg Intravenous Stopped 5/15/19 2125)   0.9 % sodium chloride IV bolus 1,293 mL (0 mL/kg × 43.1 kg Intravenous Stopped 5/15/19 2158)   acetaminophen (TYLENOL) tablet 500 mg (500 mg Oral Given 5/15/19 1901)   ipratropium-albuterol (DUONEB) nebulizer solution 1 ampule (1 ampule Inhalation Given 5/15/19 1901)   methylPREDNISolone sodium (SOLU-MEDROL) injection 125 mg (125 mg Intravenous Given 5/15/19 1901)   iopamidol (ISOVUE-370) 76 % injection 85 mL (85 mLs Intravenous Given 5/15/19 2054)     At this time, patient is ready for admission. CLINICAL IMPRESSION  1. Severe sepsis ([facility]    2. Aspiration pneumonia of lower lobe, unspecified aspiration pneumonia type, unspecified laterality ([facility]    3. Acute respiratory failure, unspecified whether with hypoxia or hypercapnia (H[facility])        Blood pressure (!) 101/59, pulse 84, temperature 99 °F (37.2 °C), temperature source Oral, resp. rate 28, height 5' (1.524 m), weight 95 lb (43.1 kg), SpO2 97 %, not currently breastfeeding. DISPOSITION  Raman Medina is in fair condition upon Admit to CCU/ICU.           Benny Edwards PA-C  05/15/19 [id number]

## 2019-05-16 NOTE — ED NOTES
Pt O2 dropped to 88% on room air. Pt placed on 2 Liters of O2 via nasal cannula.  Pt o2 now 96%     Meño Denson RN  05/15/19 [id number]

## 2019-05-16 NOTE — CONSULTS
Patient is being seen at the request of Mitesh Gutierrez MD for a consultation for hypoxia, bibasilar CAP concering for aspiration.  Severe sep    HISTORY OF PRESENT ILLNESS:   80years old with history of hypertension presented with cough for the past 2 weeks. Associated with shortness of breath. Dyspnea worse with exertion and better with resting. Purulent sputum production. Associated with generalized weakness and decreased by mouth intake. Denied any diarrhea. Denied any hemoptysis. No home O2.  CT chest showed bronchial wall thickening with collapse last consolation of the right lower lobe. No smoking. Patient is disoriented, very poor historian limited to obtain HPI. PAST MEDICAL HISTORY:  Past Medical History:   Diagnosis Date    Depression     Hyperlipidemia     Hypertension     Hypothyroidism     Osteoarthritis      PAST SURGICAL HISTORY:  Past Surgical History:   Procedure Laterality Date    INTERTROCHANTERIC HIP FRACTURE SURGERY  3/13    Lt    JOINT REPLACEMENT      Rt hip    JOINT REPLACEMENT      Rt knee       FAMILY HISTORY:  No family history for lung cancer    SOCIAL HISTORY:   reports that she has never smoked. She has never used smokeless tobacco.    Scheduled Meds:   levothyroxine  37.5 mcg Intravenous QAM AC    sodium chloride flush  10 mL Intravenous 2 times per day    enoxaparin  40 mg Subcutaneous Daily    azithromycin  500 mg Intravenous Q24H    And    cefTRIAXone (ROCEPHIN) IV  1 g Intravenous Q24H    esomeprazole  40 mg Intravenous QAM AC    Or    sodium chloride flush  5 mL Intravenous QAM AC    sodium chloride  1,000 mL Intravenous Once     Continuous Infusions:   sodium chloride 100 mL/hr at 05/16/19 0135     PRN Meds:  morphine, sodium chloride flush, albuterol, ipratropium-albuterol    ALLERGIES:  Patient is allergic to aspirin.     REVIEW OF SYSTEMS: Limited to obtain due to altered mental status  Constitutional: Negative for fever  HENT: Negative for sore throat  Eyes: Negative for redness   Respiratory: + dyspnea, cough  Cardiovascular: Negative for chest pain  Gastrointestinal: Negative for vomiting, diarrhea   Genitourinary: Negative for hematuria   Musculoskeletal: + arthralgias   Skin: Negative for rash  Neurological: Negative for syncope  Hematological: Negative for adenopathy  Psychiatric/Behavorial: Negative for anxiety    PHYSICAL EXAM:  Blood pressure 116/69, pulse 80, temperature 97.5 °F (36.4 °C), temperature source Axillary, resp. rate 24, height 5' (1.524 m), weight 102 lb 8 oz (46.5 kg), SpO2 97 %, not currently breastfeeding.' on RA  Gen: No distress. Ill-appearing  Eyes: PERRL. No sclera icterus. No conjunctival injection. ENT: No discharge. Pharynx clear. Neck: Trachea midline. No obvious mass. Resp: + accessory muscle use. Basilar crackles. No wheezes. No rhonchi. No dullness on percussion. CV: Regular rate. Regular rhythm. No murmur or rub. No edema. GI: Non-tender. Non-distended. No hernia. Skin: Warm and dry. No nodule on exposed extremities. Lymph: No cervical LAD. No supraclavicular LAD. M/S: No cyanosis. No joint deformity. No clubbing. Neuro: Awake. Alert. Moves all four extremities. Psych: Pleasantly Disoriented. No anxiety. LABS:  CBC:   Recent Labs     05/15/19  1825 05/16/19  0447   WBC 14.8* 10.2   HGB 11.5* 10.5*   HCT 34.7* 30.6*   MCV 99.3 100.3*   * 345     BMP:   Recent Labs     05/15/19  1825 05/16/19  0447   * 132*   K 4.8 4.7   CL 92* 100   CO2 23 22   BUN 14 13   CREATININE 1.2 1.0     LIVER PROFILE:   Recent Labs     05/15/19  1825   AST 40*   ALT 19   BILITOT 0.6   ALKPHOS 121     PT/INR: No results for input(s): PROTIME, INR in the last 72 hours. APTT: No results for input(s): APTT in the last 72 hours. UA:  Recent Labs     05/15/19  1935   COLORU Yellow   PHUR 5.5   LABCAST 3-5 [facility]. *   WBCUA 3-5   RBCUA 3-5*   BACTERIA 2+*   CLARITYU Clear   SPECGRAV 1.025   LEUKOCYTESUR Negative   UROBILINOGEN 0.2   BILIRUBINUR Negative   BLOODU SMALL*   GLUCOSEU Negative   AMORPHOUS 1+*     No results for input(s): PHART, [id number], PO2ART in the last 72 hours. CTPA 5/15 imaging was reviewed by me and showed   No central pulmonary embolism is detected.    No upper lung pulmonary embolism.   There is bronchial wall thickening with debris in the lower lung airways,  especially on the right.    Collapse and consolidation with the right lobe lobe  detected as well    ASSESSMENT:  · Multifocal pneumonia  · Acute encephalopathy-unclear baseline  · Dehydration  · Lactic acidosis  · Fever 102.8 and Leukocytosis    PLAN:  Supplemental oxygen to maintain SaO2 >92%; wean as tolerated  Intensive inhaled bronchodilator therapy  IV antibiotics to include ceftriaxone/Zithromax  Blood culture and Sputum GS&C  Acapella and Mucinex  Speech pathology evaluation evaluate for aspiration  Normal saline  I recommend CXR in 4-6 week to documents resolution of pulmonary infitrates

## 2019-05-16 NOTE — PLAN OF CARE
SLP completed evaluation. Please refer to notes in EMR.       Jef Auguste M.A., [address] [facility] #[id number]  Speech-Language Pathologist

## 2019-05-17 VITALS
TEMPERATURE: 96.6 F | HEIGHT: 60 IN | OXYGEN SATURATION: 96 % | DIASTOLIC BLOOD PRESSURE: 85 MMHG | SYSTOLIC BLOOD PRESSURE: 170 MMHG | RESPIRATION RATE: 18 BRPM | HEART RATE: 88 BPM | WEIGHT: 103.9 LBS | BODY MASS INDEX: 20.4 KG/M2

## 2019-05-17 PROBLEM — R65.20 SEVERE SEPSIS (HCC): Status: RESOLVED | Noted: 2019-05-15 | Resolved: 2019-05-17

## 2019-05-17 PROBLEM — A41.9 SEVERE SEPSIS (HCC): Status: RESOLVED | Noted: 2019-05-15 | Resolved: 2019-05-17

## 2019-05-17 LAB
CULTURE, RESPIRATORY: ABNORMAL
CULTURE, RESPIRATORY: ABNORMAL
GRAM STAIN RESULT: ABNORMAL
ORGANISM: ABNORMAL

## 2019-05-17 PROCEDURE — 99233 SBSQ HOSP IP/OBS HIGH 50: CPT | Performed by: INTERNAL MEDICINE

## 2019-05-17 PROCEDURE — 6370000000 HC RX 637 (ALT 250 FOR IP): Performed by: INTERNAL MEDICINE

## 2019-05-17 PROCEDURE — 99238 HOSP IP/OBS DSCHRG MGMT 30/<: CPT | Performed by: INTERNAL MEDICINE

## 2019-05-17 PROCEDURE — 6360000002 HC RX W HCPCS: Performed by: INTERNAL MEDICINE

## 2019-05-17 RX ORDER — GABAPENTIN 300 MG/1
300 CAPSULE ORAL NIGHTLY
Status: DISCONTINUED | OUTPATIENT
Start: 2019-05-17 | End: 2019-05-17 | Stop reason: HOSPADM

## 2019-05-17 RX ORDER — LEVOFLOXACIN 500 MG/1
500 TABLET, FILM COATED ORAL DAILY
Qty: 7 TABLET | Refills: 0 | Status: SHIPPED | OUTPATIENT
Start: 2019-05-17 | End: 2019-05-24

## 2019-05-17 RX ORDER — ALBUTEROL SULFATE 90 UG/1
2 AEROSOL, METERED RESPIRATORY (INHALATION) 4 TIMES DAILY PRN
Qty: 1 INHALER | Refills: 1 | Status: SHIPPED | OUTPATIENT
Start: 2019-05-17 | End: 2021-10-26 | Stop reason: ALTCHOICE

## 2019-05-17 RX ORDER — LACTOBACILLUS RHAMNOSUS GG 10B CELL
1 CAPSULE ORAL DAILY
Qty: 10 CAPSULE | Refills: 0 | Status: SHIPPED | OUTPATIENT
Start: 2019-05-17 | End: 2019-05-27

## 2019-05-17 RX ORDER — OXYCODONE HYDROCHLORIDE AND ACETAMINOPHEN 5; 325 MG/1; MG/1
1 TABLET ORAL SEE ADMIN INSTRUCTIONS
Qty: 90 TABLET | Refills: 0 | COMMUNITY
Start: 2019-05-17 | End: 2019-05-30

## 2019-05-17 RX ORDER — OXYCODONE HYDROCHLORIDE AND ACETAMINOPHEN 5; 325 MG/1; MG/1
1 TABLET ORAL NIGHTLY PRN
Status: DISCONTINUED | OUTPATIENT
Start: 2019-05-17 | End: 2019-05-17 | Stop reason: HOSPADM

## 2019-05-17 RX ORDER — PANTOPRAZOLE SODIUM 40 MG/1
40 TABLET, DELAYED RELEASE ORAL
Status: DISCONTINUED | OUTPATIENT
Start: 2019-05-17 | End: 2019-05-17 | Stop reason: HOSPADM

## 2019-05-17 RX ADMIN — LEVOFLOXACIN 375 MG: 750 TABLET, FILM COATED ORAL at 09:28

## 2019-05-17 RX ADMIN — LEVOTHYROXINE SODIUM 75 MCG: 25 TABLET ORAL at 06:31

## 2019-05-17 RX ADMIN — GUAIFENESIN 600 MG: 600 TABLET, EXTENDED RELEASE ORAL at 09:27

## 2019-05-17 RX ADMIN — PANTOPRAZOLE SODIUM 40 MG: 40 TABLET, DELAYED RELEASE ORAL at 06:31

## 2019-05-17 RX ADMIN — AZITHROMYCIN 250 MG: 250 TABLET, FILM COATED ORAL at 09:26

## 2019-05-17 RX ADMIN — GABAPENTIN 300 MG: 300 CAPSULE ORAL at 07:39

## 2019-05-17 NOTE — PROGRESS NOTES
Pt educated on discharge instructions, new prescriptions use and side effects. Daughter in law and son at bedside and verified knowledge.

## 2019-05-17 NOTE — DISCHARGE SUMMARY
Name:  Noemí Montalvo  Room:  /4722-62  MRN:    [id number]    Discharge Summary      This discharge summary is in conjunction with a complete physical exam done on the day of discharge. Discharging Physician: Dr. Eric Martinez: 5/15/2019  Discharge:  5/17/2019    HPI taken from admission H&P:    The patient is a 80 y.o. female with PMH below, presents with productive cough, decreased PO intake, generalized weakness. Family reports that she has been unwell since Easter. She has had a worsening cough over that time. Her cough has been increasingly productive of yellow green sputum. They report that she has declined significantly over the last 2 days. She has not been eating or drinking much, not moving around as much as usual and overall appears much more weak over that time. She is very hard of hearing. Diagnoses this Admission and Hospital Course     Sepsis - POA  - source: pneumonia . - presented with Tmax was 102.8°F, w/ HR of 103 & RR of 34 and presentation. WBC was elevated 14.8  - abx as below, hydrated with IVF   - Improved, Afebrile now. HR stable, WBC down to 10.2. Did not need pressors. - sepsis resolved, abx at d/c as below     Community-acquired pneumonia   - suspected Strep pneumonia or other gram positive organism  - Pulmonology consulted   - Continued Rocephin and Zithromax.    - Follow up on cultures - heavy growth Step pneumo  - discharged on Levaquin with lactobacillus     Hyponatremia  - mild at 129  - improved with hydration with normal saline and sodium level 132.  - Diarrhea improved     Lactic acidosis  -  resolved with IV hydration 2.3 -  0.7 at d/c    Procedures (Please Review Full Report for Details)  N/A    Consults    Pulmonology    Physical Exam at Discharge:    BP (!) 170/85   Pulse 88   Temp 96.6 °F (35.9 °C) (Oral)   Resp 18   Ht 5' (1.524 m)   Wt 103 lb 14.4 oz (47.1 kg)   SpO2 96%   BMI 20.29 kg/m²   General:  Awake, alert, NAD  Elderly , frail    Skin:  Warm and dry  Neck:  JVD absent. Neck supple  Chest:  Diminished breath sounds. No wheezes, rales or rhonchi. Cardiovascular:  RRR ,S1S2 normal  Abdomen:  Soft, non tender, non distended, BS +  Extremities:  No edema. Intact peripheral pulses. Brisk cap refill, < 2 secs  Neuro: non focal    CBC:   Recent Labs     05/15/19  1825 05/16/19  0447   WBC 14.8* 10.2   HGB 11.5* 10.5*   HCT 34.7* 30.6*   MCV 99.3 100.3*   * 345     BMP:   Recent Labs     05/15/19  1825 05/16/19  0447   * 132*   K 4.8 4.7   CL 92* 100   CO2 23 22   BUN 14 13   CREATININE 1.2 1.0     LIVER PROFILE:   Recent Labs     05/15/19  1825   AST 40*   ALT 19   BILITOT 0.6   ALKPHOS 121     UA:  Recent Labs     05/15/19  1935   COLORU Yellow   PHUR 5.5   LABCAST 3-5 Fine Gran. *   WBCUA 3-5   RBCUA 3-5*   BACTERIA 2+*   CLARITYU Clear   SPECGRAV 1.025   LEUKOCYTESUR Negative   UROBILINOGEN 0.2   BILIRUBINUR Negative   BLOODU SMALL*   GLUCOSEU Negative   AMORPHOUS 1+*     CULTURES    Resp cx: NRF absent, 3+ WBC - poly, 3+ GP cocci, heavy growth Strep pneumo    Susceptibility     Streptococcus pneumoniae (1)     Antibiotic Interpretation [place] Status    cefTRIAXone Intermediate 1 mcg/mL     erythromycin Resistant >0.5 mcg/mL     levofloxacin Sensitive 1 mcg/mL     penicillin Resistant 4 mcg/mL     trimethoprim-sulfamethoxazole Resistant >2/38 mcg/mL     vancomycin Sensitive 0.25 mcg/mL       Blood cx x2: NGTD    RADIOLOGY    CT CHEST PULMONARY EMBOLISM W CONTRAST 5/15/2019   Final Result   Extensive respiratory motion within the lower lungs, limited evaluation of   the segmental and subsegmental pulmonary arterial branches within the lower   lungs. No central pulmonary embolism is detected. No upper lung pulmonary   embolism. There is bronchial wall thickening with debris in the lower lung airways,   especially on the right. Collapse and consolidation with the right lobe lobe   detected as well. Aspiration would be the primary consideration. XR CHEST STANDARD (2 VW) 5/15/2019   Final Result   No acute disease. Discharge Medications     Medication List      START taking these medications    albuterol sulfate  (90 Base) MCG/ACT inhaler  Inhale 2 puffs into the lungs 4 times daily as needed for Wheezing  Notes to patient:  Albuterol/Ipratropium (Duo Neb*)  Use: to open airways, reduce secretions   Side Effects: nervousness, jitteriness, shakiness, headache, fast heartbeat     lactobacillus capsule  Take 1 capsule by mouth daily for 10 days  Notes to patient:  Use: Probiotic, prevent infection  Side effects: increase gas & bloating     levofloxacin 500 MG tablet  Commonly known as:  LEVAQUIN  Take 1 tablet by mouth daily for 7 days  Notes to patient:  Levofloxacin (Levaquin*)  Use: Treat infection  Side effects: Headache, diarrhea, nausea and vomiting        CHANGE how you take these medications    misoprostol 200 MCG tablet  Commonly known as:  CYTOTEC  TAKE 1 TABLET TWICE A DAY  What changed:  See the new instructions. CONTINUE taking these medications    BENADRYL 25 MG capsule  Generic drug:  diphenhydrAMINE     enalapril 5 MG tablet  Commonly known as:  VASOTEC  TAKE 1 TABLET DAILY     gabapentin 300 MG capsule  Commonly known as:  NEURONTIN  TAKE 1 CAPSULE AT BEDTIME     levothyroxine 75 MCG tablet  Commonly known as:  SYNTHROID  TAKE 1 TABLET DAILY     oxyCODONE-acetaminophen 5-325 MG per tablet  Commonly known as:  PERCOCET        STOP taking these medications    NYQUIL PO           Where to Get Your Medications      You can get these medications from any pharmacy    Bring a paper prescription for each of these medications  · albuterol sulfate  (90 Base) MCG/ACT inhaler  · lactobacillus capsule  · levofloxacin 500 MG tablet           Discharged in stable condition to home. Follow Up: Follow up with PCP in 1 week.         Rey Rossi MD  5/17/19

## 2019-05-17 NOTE — PROGRESS NOTES
Occupational Therapy/Physical Therapy    Attempted follow up this a.m. Per RN, pt with elevated BP; RN requests HOLD a.m. Therapies and follow up in p.m. Heron attempt again in p.m. as schedule permits and pt is able.     Bhavana Mendoza, OTR/L  Lic # [id number]  Alex Larson, PT, DPT #[id number]

## 2019-05-17 NOTE — PLAN OF CARE
Nutrition Problem: Swallowing difficulty  Intervention: Food and/or Nutrient Delivery: Continue current diet, Start ONS  Nutritional Goals: pt will increase her intake of kcal and protein  by consuming > 50% of meals and ensure high Protein and her body weight will be > 100#'

## 2019-05-17 NOTE — CARE COORDINATION
DISCHARGE ORDER  Date/Time 2019 2:33 PM  Completed by: Cory Tabor, Case Management    Patient Name: Brian El    : 1935  Admitting Diagnosis: Severe sepsis ([address] [A41.9, R65.20]  Admit Date/Time: 5/15/2019  6:00 PM    Noted discharge order. Confirmed discharge plan with patient / family (Belinda): Yes   Discharge Plan:  Order for dc noted. Spoke with pt and erik Belinda and plan remains for home with family. Discussed HHC and pt and family decline at this time. Writer # provided if gets home and decides would like [facility]. Chart reviewed and no other dc needs identified.

## 2019-05-17 NOTE — DISCHARGE INSTR - COC
Continuity of Care Form    Patient Name: Meredith Goss   :  1935  MRN:  [id number]    Admit date:  5/15/2019  Discharge date:  ***    Code Status Order: Full Code   Advance Directives:   Advance Care Flowsheet Documentation     Date/Time Healthcare Directive Type of Healthcare Directive Copy in [address] Agent's Name Healthcare Agent's Phone Number    [phone]  No, patient does not have an advance directive for healthcare treatment -- -- -- -- --          Admitting Physician:  Fernando Owen MD  PCP: Gonsalo Naylor DO    Discharging Nurse: [facility] Unit/Room#: /7309-91  Discharging Unit Phone Number: ***    Emergency Contact:   Extended Emergency Contact Information  Primary Emergency Contact: Elkin Holt  [facility]  Home Phone: [phone]  Mobile Phone: [phone]  Relation: Child  Secondary Emergency Contact: Mercedesbrayden Perezgege, [address]  Home Phone: [phone]  Mobile Phone: [phone]  Relation: Child    Past Surgical History:  Past Surgical History:   Procedure Laterality Date    INTERTROCHANTERIC HIP FRACTURE SURGERY  3/13    Lt    JOINT REPLACEMENT      Rt hip    JOINT REPLACEMENT      Rt knee       Immunization History:   Immunization History   Administered Date(s) Administered    Influenza Virus Vaccine 2015    Influenza, High Dose (Fluzone 65 yrs and older) 10/20/2016, 2017, 2018    Pneumococcal 13-valent Conjugate (Pmtupyx01) 2015    Pneumococcal Polysaccharide (Epvjqqmmo11) 10/20/2016       Active Problems:  Patient Active Problem List   Diagnosis Code    Hypothyroidism E03.9    Osteoarthritis M19.90    Osteoporosis M81.0    IBS (irritable bowel syndrome) K58.9    Essential hypertension I10    Anxiety and depression F41.9, F32.9    Palpitations R00.2    Pain medication agreement signed Z02.89    Anemia D64.9    Pelvic fracture ([address] AM   Red%Wound Bed 100 5/16/2019  1:08 AM   Number of days: 1       Wound 05/15/19 Heel Right;Posterior very slow to pepe redness right heel, soft (Active)   Wound Pressure Stage  1 5/17/2019 10:13 AM   Dressing Status Clean; Intact;Dry 5/17/2019 10:13 AM   Dressing Changed Changed/New 5/16/2019  1:08 AM   Dressing/Treatment Foam 5/16/2019  1:08 AM   Dressing Change Due 05/19/19 5/16/2019  1:08 AM   Wound Length (cm) 2 cm 5/16/2019  1:08 AM   Wound Width (cm) 2 cm 5/16/2019  1:08 AM   Wound Depth (cm) 0 cm 5/16/2019  1:08 AM   Wound Surface Area (cm^2) 4 cm^2 5/16/2019  1:08 AM   Wound Volume (cm^3) 0 cm^3 5/16/2019  1:08 AM   Wound Assessment Clean;Dry; Intact 5/16/2019  1:08 AM   Drainage Amount None 5/16/2019  1:08 AM   Odor None 5/16/2019  1:08 AM   Red%Wound Bed 100 5/16/2019  1:08 AM   Number of days: 1        Elimination:  Continence:   · Bowel: {YES / RC:77401}  · Bladder: {YES / BD:66692}  Urinary Catheter: {Urinary Catheter:946290581}   Colostomy/Ileostomy/Ileal Conduit: {YES / GO:15710}       Date of Last BM: ***    Intake/Output Summary (Last 24 hours) at 5/17/2019 1217  Last data filed at 5/17/2019 1011  Gross per 24 hour   Intake 420 ml   Output 1000 ml   Net -580 ml     I/O last 3 completed shifts:   In: 18 [P.O.:480]  Out: 1000 [Urine:1000]    Safety Concerns:     [address] [employer] Safety Concerns:099050341}    Impairments/Disabilities:      [address] [employer] Impairments/Disabilities:059804538}    Nutrition Therapy:  Current Nutrition Therapy:   [address] Diet List:619112318}    Routes of Feeding: {CHP DME Other Feedings:659753791}  Liquids: {Slp liquid thickness:26670}  Daily Fluid Restriction: {CHP DME Yes amt example:555951055}  Last Modified Barium Swallow with Video (Video Swallowing Test): {Done Not Done VCVB:858851967}    Treatments at the Time of Hospital Discharge:   Respiratory Treatments: ***  Oxygen Therapy:  {Therapy; copd oxygen:48838}  Ventilator:    {MH CC Vent MQLN:634465265}    Rehab Therapies: {THERAPEUTIC INTERVENTION:9510003891}  Weight Bearing Status/Restrictions: [address] CC Weight Bearin}  Other Medical Equipment (for information only, NOT a DME order):  {EQUIPMENT:386409226}  Other Treatments: ***    Patient's personal belongings (please select all that are sent with patient):  {CHP DME Belongings:974967460}    RN SIGNATURE:  {Esignature:169033121}    CASE MANAGEMENT/SOCIAL WORK SECTION    Inpatient Status Date: ***    Readmission Risk Assessment Score:  Readmission Risk              Risk of Unplanned Readmission:        13           Discharging to Facility/ Agency   · Name:   · Address:  · Phone:  · Fax:    Dialysis Facility (if applicable)   · Name:  · Address:  · Dialysis Schedule:  · Phone:  · Fax:    / signature: {Esignature:398824614}    PHYSICIAN SECTION    Prognosis: Good    Condition at Discharge: Stable    Rehab Potential (if transferring to Rehab): Good    Recommended Labs or Other Treatments After Discharge:     Physician Certification: I certify the above information and transfer of Renetta Brock  is necessary for the continuing treatment of the diagnosis listed and that she requires Home Care for less 30 days.      Update Admission H&P: No change in H&P    PHYSICIAN SIGNATURE:  Electronically signed by Benjamin Ojeda MD on 19 at 12:17 PM

## 2019-05-17 NOTE — PROGRESS NOTES
per day and has noted decreased desire for food   · Wound Type: Stage I, Pressure Ulcer  · Current Nutrition Therapies:  · Oral Diet Orders: Dysphagia 2   · Oral Diet intake: 0%  · Oral Nutrition Supplement (ONS) Orders: None  · Anthropometric Measures:  · Ht: 5' (152.4 cm)   · Current Body Wt: 103 lb (46.7 kg)  · Admission Body Wt: 103 lb (46.7 kg)  · Usual Body Wt: 100 lb (45.4 kg)  · % Weight Change:  ,  none noted   · Ideal Body Wt: 100 lb (45.4 kg), % Ideal Body (103)  · Adjusted Body Wt: (NA), body weight adjusted for (NA)  · BMI Classification: BMI 18.5 - 24.9 Normal Weight    Nutrition Interventions:   Continue current diet, Start ONS  Continued Inpatient Monitoring, Coordination of Care, Coordination of Community Care    Nutrition Evaluation:   · Evaluation: Goals set   · Goals: pt will increase her intake of kcal and protein  by consuming > 50% of meals and ensure high Protein and her body weight will be > 100#    · Monitoring: Nutrition Progression, Supplement Intake, Meal Intake, Wound Healing, Chewing/Swallowing, Weight      Electronically signed by Tomasa Brody RD, LD on 5/17/19 at 12:19 PM    Contact Number: [id number]

## 2019-05-17 NOTE — PLAN OF CARE
Problem: Falls - Risk of:  Goal: Will remain free from falls  Description  Will remain free from falls  Outcome: Ongoing  Goal: Absence of physical injury  Description  Absence of physical injury  Outcome: Ongoing     Problem: Risk for Impaired Skin Integrity  Goal: Tissue integrity - skin and mucous membranes  Description  Structural intactness and normal physiological function of skin and  mucous membranes. Outcome: Ongoing     Problem: SAFETY  Goal: Free from accidental physical injury  Outcome: Ongoing  Goal: Free from intentional harm  Outcome: Ongoing     Problem: DAILY CARE  Goal: Daily care needs are met  Outcome: Ongoing     Problem: PAIN  Goal: Patient's pain/discomfort is manageable  Outcome: Ongoing     Problem: SKIN INTEGRITY  Goal: Skin integrity is maintained or improved  Outcome: Ongoing     Problem: KNOWLEDGE DEFICIT  Goal: Patient/S.O. demonstrates understanding of disease process, treatment plan, medications, and discharge instructions.   Outcome: Ongoing     Problem: DISCHARGE BARRIERS  Goal: Patient's continuum of care needs are met  Outcome: Ongoing     Problem: Nutritional:  Goal: Ability to tolerate tube feedings without aspirating will improve  Description  Ability to tolerate tube feedings without aspirating will improve  Outcome: Ongoing  Goal: Consumption of food in small portions  Description  Consumption of food in small portions  Outcome: Ongoing  Goal: Consumption of liquid of appropriate consistency  Description  Consumption of liquid of appropriate consistency  Outcome: Ongoing     Problem: Respiratory:  Goal: Ability to maintain a clear airway will improve  Description  Ability to maintain a clear airway will improve  Outcome: Ongoing  Goal: Will remain free from infection  Description  Will remain free from infection  Outcome: Ongoing  Goal: Absence of aspiration  Description  Absence of aspiration  Outcome: Ongoing     Problem: Safety:  Goal: Ability to chew and swallow food without choking will improve  Description  Ability to chew and swallow food without choking will improve  Outcome: Ongoing  Goal: Ability to demonstrate good, daily oral hygiene techniques will improve  Description  Ability to demonstrate good, daily oral hygiene techniques will improve  Outcome: Ongoing  Goal: Maintenance of upright position during and after feeding  Description  Maintenance of upright position during and after feeding  Outcome: Ongoing     Problem: Discharge Planning:  Goal: Discharged to appropriate level of care  Description  Discharged to appropriate level of care  Outcome: Ongoing  Goal: Participates in care planning  Description  Participates in care planning  Outcome: Ongoing     Problem: Airway Clearance - Ineffective:  Goal: Clear lung sounds  Description  Clear lung sounds  Outcome: Ongoing     Problem: Fluid Volume - Deficit:  Goal: Achieves intake and output within specified parameters  Description  Achieves intake and output within specified parameters  Outcome: Ongoing     Problem: Gas Exchange - Impaired:  Goal: Levels of oxygenation will improve  Description  Levels of oxygenation will improve  Outcome: Ongoing     Problem: Hyperthermia:  Goal: Ability to maintain a body temperature in the normal range will improve  Description  Ability to maintain a body temperature in the normal range will improve  Outcome: Ongoing     Problem: Tobacco Use:  Goal: Will participate in inpatient tobacco-use cessation counseling  Description  Will participate in inpatient tobacco-use cessation counseling  Outcome: Ongoing     Problem: Infection, Septic Shock:  Goal: Will show no infection signs and symptoms  Description  Will show no infection signs and symptoms  Outcome: Ongoing     Problem: Infection - Ventilator-Associated Pneumonia:  Goal: Absence of pulmonary infection  Description  Absence of pulmonary infection  Outcome: Ongoing     Problem: Serum Glucose Level - Abnormal:  Goal: Ability to maintain appropriate glucose levels will improve  Description  Ability to maintain appropriate glucose levels will improve  Outcome: Ongoing     Problem: Tissue Perfusion, Altered:  Goal: Circulatory function within specified parameters  Description  Circulatory function within specified parameters  Outcome: Ongoing     Problem: Venous Thromboembolism:  Goal: Will show no signs or symptoms of venous thromboembolism  Description  Will show no signs or symptoms of venous thromboembolism  Outcome: Ongoing  Goal: Absence of signs or symptoms of impaired coagulation  Description  Absence of signs or symptoms of impaired coagulation  Outcome: Ongoing

## 2019-05-17 NOTE — FLOWSHEET NOTE
05/17/19 0343   Vital Signs   Temp 97.8 °F (36.6 °C)   Temp Source Oral   Pulse 83   Heart Rate Source Monitor   Resp 20   BP (!) 156/81   BP Location Left upper arm   BP Upper/Lower Upper   Level of Consciousness 0   MEWS Score 1   Pain Assessment   Response to Pain Intervention Other (Comment)  (did not help much)   Oxygen Therapy   SpO2 93 %   O2 Device None (Room air)   Patient removed new IV.  aware patient has no IV access.

## 2019-05-17 NOTE — PROGRESS NOTES
Pulmonary Progress Note    CC: Pneumonia    Subjective:   More oriented today  On room air          Intake/Output Summary (Last 24 hours) at 5/17/2019 0752  Last data filed at 5/17/2019 0606  Gross per 24 hour   Intake 480 ml   Output 875 ml   Net -395 ml       Exam:   BP (!) 156/81   Pulse 83   Temp 97.8 °F (36.6 °C) (Oral)   Resp 20   Ht 5' (1.524 m)   Wt 103 lb 14.4 oz (47.1 kg)   SpO2 93%   BMI 20.29 kg/m²  on room air  Gen: No distress. Ill-appearing  Eyes: PERRL. No sclera icterus. No conjunctival injection. ENT: No discharge. Pharynx clear. Neck: Trachea midline. No obvious mass. Resp: No accessory muscle use. Basilar crackles. No wheezes. No rhonchi. No dullness on percussion. CV: Regular rate. Regular rhythm. No murmur or rub. No edema. GI: Non-tender. Non-distended. No hernia. Skin: Warm and dry. No nodule on exposed extremities. Lymph: No cervical LAD. No supraclavicular LAD. M/S: No cyanosis. No joint deformity. No clubbing. Neuro: Awake. Alert. Moves all four extremities. Psych:  more oriented. No anxiety.          Scheduled Meds:   gabapentin  300 mg Oral Nightly    levothyroxine  75 mcg Oral Daily    pantoprazole  40 mg Oral QAM AC    sodium chloride flush  10 mL Intravenous 2 times per day    enoxaparin  40 mg Subcutaneous Daily    guaiFENesin  600 mg Oral BID    levofloxacin  375 mg Oral Daily    azithromycin  250 mg Oral Daily    sodium chloride  1,000 mL Intravenous Once     Continuous Infusions:   sodium chloride 75 mL/hr at 05/16/19 1130     PRN Meds:  oxyCODONE-acetaminophen, sodium chloride flush, albuterol, ipratropium-albuterol, methyl salicylate-menthol    Labs:  CBC:   Recent Labs     05/15/19  1825 05/16/19  0447   WBC 14.8* 10.2   HGB 11.5* 10.5*   HCT 34.7* 30.6*   MCV 99.3 100.3*   * 345     BMP:   Recent Labs     05/15/19  1825 05/16/19  0447   * 132*   K 4.8 4.7   CL 92* 100   CO2 23 22   BUN 14 13   CREATININE 1.2 1.0     LIVER PROFILE:   Recent Labs     05/15/19  1825   AST 40*   ALT 19   BILITOT 0.6   ALKPHOS 121     PT/INR: No results for input(s): PROTIME, INR in the last 72 hours. APTT: No results for input(s): APTT in the last 72 hours. UA:  Recent Labs     05/15/19  1935   COLORU Yellow   PHUR 5.5   LABCAST 3-5 [facility]. *   WBCUA 3-5   RBCUA 3-5*   BACTERIA 2+*   CLARITYU Clear   SPECGRAV 1.025   LEUKOCYTESUR Negative   UROBILINOGEN 0.2   BILIRUBINUR Negative   BLOODU SMALL*   GLUCOSEU Negative   AMORPHOUS 1+*     No results for input(s): PHART, PUN0NHE, PO2ART in the last 72 hours. Cultures:   Blood culture negative  Respiratory culture pending    Films:  CTPA 5/15   No central pulmonary embolism is detected.    No upper lung pulmonary embolism.   There is bronchial wall thickening with debris in the lower lung airways,  especially on the right.    Collapse and consolidation with the right lobe lobe  detected as well     ASSESSMENT:  · Multifocal pneumonia  · Acute encephalopathy-unclear baseline  · Dehydration  · Lactic acidosis  · Fever 102.8 and Leukocytosis     PLAN:  · Supplemental oxygen to maintain SaO2 >92%; wean as tolerated  · Intensive inhaled bronchodilator therapy  · IV antibiotics to include ceftriaxone/Zithromax day #3  · Follow-up cultures   · Acapella and Mucinex  · Speech pathology evaluation evaluate for aspiration  · Normal saline  · I recommend CXR in 4-6 week to documents resolution of pulmonary infitrates

## 2019-05-20 LAB
BLOOD CULTURE, ROUTINE: NORMAL
CULTURE, BLOOD 2: NORMAL

## 2019-05-30 DIAGNOSIS — G89.4 CHRONIC PAIN SYNDROME: ICD-10-CM

## 2019-05-30 RX ORDER — OXYCODONE HYDROCHLORIDE AND ACETAMINOPHEN 5; 325 MG/1; MG/1
1 TABLET ORAL SEE ADMIN INSTRUCTIONS
Qty: 90 TABLET | Refills: 0 | Status: SHIPPED | OUTPATIENT
Start: 2019-05-30 | End: 2019-07-03

## 2019-07-03 DIAGNOSIS — G89.4 CHRONIC PAIN SYNDROME: ICD-10-CM

## 2019-07-03 RX ORDER — OXYCODONE HYDROCHLORIDE AND ACETAMINOPHEN 5; 325 MG/1; MG/1
1 TABLET ORAL SEE ADMIN INSTRUCTIONS
Qty: 90 TABLET | Refills: 0 | Status: SHIPPED | OUTPATIENT
Start: 2019-07-03 | End: 2019-08-05 | Stop reason: SDUPTHER

## 2019-08-05 ENCOUNTER — OFFICE VISIT (OUTPATIENT)
Dept: FAMILY MEDICINE CLINIC | Age: 84
End: 2019-08-05
Payer: MEDICARE

## 2019-08-05 VITALS
HEIGHT: 61 IN | WEIGHT: 103.8 LBS | SYSTOLIC BLOOD PRESSURE: 118 MMHG | OXYGEN SATURATION: 93 % | DIASTOLIC BLOOD PRESSURE: 82 MMHG | BODY MASS INDEX: 19.6 KG/M2 | HEART RATE: 78 BPM

## 2019-08-05 DIAGNOSIS — F01.50 VASCULAR DEMENTIA WITHOUT BEHAVIORAL DISTURBANCE (HCC): ICD-10-CM

## 2019-08-05 DIAGNOSIS — I10 ESSENTIAL HYPERTENSION: ICD-10-CM

## 2019-08-05 DIAGNOSIS — G89.4 CHRONIC PAIN SYNDROME: Primary | ICD-10-CM

## 2019-08-05 PROCEDURE — 1036F TOBACCO NON-USER: CPT | Performed by: FAMILY MEDICINE

## 2019-08-05 PROCEDURE — G8420 CALC BMI NORM PARAMETERS: HCPCS | Performed by: FAMILY MEDICINE

## 2019-08-05 PROCEDURE — G8400 PT W/DXA NO RESULTS DOC: HCPCS | Performed by: FAMILY MEDICINE

## 2019-08-05 PROCEDURE — 1090F PRES/ABSN URINE INCON ASSESS: CPT | Performed by: FAMILY MEDICINE

## 2019-08-05 PROCEDURE — 99214 OFFICE O/P EST MOD 30 MIN: CPT | Performed by: FAMILY MEDICINE

## 2019-08-05 PROCEDURE — 1123F ACP DISCUSS/DSCN MKR DOCD: CPT | Performed by: FAMILY MEDICINE

## 2019-08-05 PROCEDURE — 4040F PNEUMOC VAC/ADMIN/RCVD: CPT | Performed by: FAMILY MEDICINE

## 2019-08-05 PROCEDURE — G8427 DOCREV CUR MEDS BY ELIG CLIN: HCPCS | Performed by: FAMILY MEDICINE

## 2019-08-05 RX ORDER — MISOPROSTOL 200 UG/1
TABLET ORAL
Qty: 90 TABLET | Refills: 3 | Status: SHIPPED | OUTPATIENT
Start: 2019-08-05 | End: 2020-10-05

## 2019-08-05 RX ORDER — OXYCODONE HYDROCHLORIDE AND ACETAMINOPHEN 5; 325 MG/1; MG/1
1 TABLET ORAL SEE ADMIN INSTRUCTIONS
Qty: 90 TABLET | Refills: 0 | Status: SHIPPED | OUTPATIENT
Start: 2019-08-05 | End: 2019-09-06 | Stop reason: SDUPTHER

## 2019-08-05 ASSESSMENT — PATIENT HEALTH QUESTIONNAIRE - PHQ9
SUM OF ALL RESPONSES TO PHQ QUESTIONS 1-9: 0
1. LITTLE INTEREST OR PLEASURE IN DOING THINGS: 0
SUM OF ALL RESPONSES TO PHQ9 QUESTIONS 1 & 2: 0
SUM OF ALL RESPONSES TO PHQ QUESTIONS 1-9: 0
2. FEELING DOWN, DEPRESSED OR HOPELESS: 0

## 2019-08-05 ASSESSMENT — ENCOUNTER SYMPTOMS
ABDOMINAL PAIN: 0
COUGH: 0
BLOOD IN STOOL: 0
SHORTNESS OF BREATH: 0

## 2019-08-05 NOTE — PATIENT INSTRUCTIONS
Chronic pain syndrome  -     oxyCODONE-acetaminophen (PERCOCET) 5-325 MG per tablet; Take 1 tablet by mouth See Admin Instructions for 30 days. 1 AM & 2 PM  Meds as needed and try and reduce when possible. Try and keep her moving around as much as possible. Essential hypertension  Continue medications and no added salt diet. Vascular dementia without behavioral disturbance  Continue to monitor. No medications at this time. Other orders  -     misoprostol (CYTOTEC) 200 MCG tablet; TAKE 1 TABLET Once per day    See me 3 months.

## 2019-09-06 DIAGNOSIS — G89.4 CHRONIC PAIN SYNDROME: ICD-10-CM

## 2019-09-06 RX ORDER — OXYCODONE HYDROCHLORIDE AND ACETAMINOPHEN 5; 325 MG/1; MG/1
1 TABLET ORAL SEE ADMIN INSTRUCTIONS
Qty: 90 TABLET | Refills: 0 | Status: SHIPPED | OUTPATIENT
Start: 2019-09-06 | End: 2019-09-11 | Stop reason: SDUPTHER

## 2019-09-11 ENCOUNTER — TELEPHONE (OUTPATIENT)
Dept: FAMILY MEDICINE CLINIC | Age: 84
End: 2019-09-11

## 2019-09-11 DIAGNOSIS — G89.4 CHRONIC PAIN SYNDROME: ICD-10-CM

## 2019-09-11 RX ORDER — OXYCODONE HYDROCHLORIDE AND ACETAMINOPHEN 5; 325 MG/1; MG/1
1 TABLET ORAL SEE ADMIN INSTRUCTIONS
Qty: 90 TABLET | Refills: 0 | Status: SHIPPED | OUTPATIENT
Start: 2019-09-11 | End: 2019-10-09 | Stop reason: SDUPTHER

## 2019-09-26 ENCOUNTER — TELEPHONE (OUTPATIENT)
Dept: FAMILY MEDICINE CLINIC | Age: 84
End: 2019-09-26

## 2019-10-09 DIAGNOSIS — G89.4 CHRONIC PAIN SYNDROME: ICD-10-CM

## 2019-10-09 RX ORDER — OXYCODONE HYDROCHLORIDE AND ACETAMINOPHEN 5; 325 MG/1; MG/1
TABLET ORAL
Qty: 90 TABLET | Refills: 0 | Status: SHIPPED | OUTPATIENT
Start: 2019-10-09 | End: 2019-11-06 | Stop reason: SDUPTHER

## 2019-11-06 DIAGNOSIS — G89.4 CHRONIC PAIN SYNDROME: ICD-10-CM

## 2019-11-08 RX ORDER — OXYCODONE HYDROCHLORIDE AND ACETAMINOPHEN 5; 325 MG/1; MG/1
TABLET ORAL
Qty: 90 TABLET | Refills: 0 | Status: SHIPPED | OUTPATIENT
Start: 2019-11-08 | End: 2019-12-05 | Stop reason: SDUPTHER

## 2019-12-05 ENCOUNTER — OFFICE VISIT (OUTPATIENT)
Dept: FAMILY MEDICINE CLINIC | Age: 84
End: 2019-12-05
Payer: MEDICARE

## 2019-12-05 VITALS
SYSTOLIC BLOOD PRESSURE: 124 MMHG | HEART RATE: 93 BPM | BODY MASS INDEX: 20.77 KG/M2 | DIASTOLIC BLOOD PRESSURE: 82 MMHG | OXYGEN SATURATION: 86 % | WEIGHT: 110 LBS | HEIGHT: 61 IN

## 2019-12-05 DIAGNOSIS — G89.4 CHRONIC PAIN SYNDROME: Primary | ICD-10-CM

## 2019-12-05 DIAGNOSIS — R73.9 HYPERGLYCEMIA: ICD-10-CM

## 2019-12-05 DIAGNOSIS — F01.50 VASCULAR DEMENTIA WITHOUT BEHAVIORAL DISTURBANCE (HCC): ICD-10-CM

## 2019-12-05 DIAGNOSIS — I10 ESSENTIAL HYPERTENSION: ICD-10-CM

## 2019-12-05 LAB — HBA1C MFR BLD: 5.7 %

## 2019-12-05 PROCEDURE — 1036F TOBACCO NON-USER: CPT | Performed by: FAMILY MEDICINE

## 2019-12-05 PROCEDURE — 1090F PRES/ABSN URINE INCON ASSESS: CPT | Performed by: FAMILY MEDICINE

## 2019-12-05 PROCEDURE — 83036 HEMOGLOBIN GLYCOSYLATED A1C: CPT | Performed by: FAMILY MEDICINE

## 2019-12-05 PROCEDURE — G0008 ADMIN INFLUENZA VIRUS VAC: HCPCS | Performed by: FAMILY MEDICINE

## 2019-12-05 PROCEDURE — G8482 FLU IMMUNIZE ORDER/ADMIN: HCPCS | Performed by: FAMILY MEDICINE

## 2019-12-05 PROCEDURE — 99214 OFFICE O/P EST MOD 30 MIN: CPT | Performed by: FAMILY MEDICINE

## 2019-12-05 PROCEDURE — G8427 DOCREV CUR MEDS BY ELIG CLIN: HCPCS | Performed by: FAMILY MEDICINE

## 2019-12-05 PROCEDURE — G8400 PT W/DXA NO RESULTS DOC: HCPCS | Performed by: FAMILY MEDICINE

## 2019-12-05 PROCEDURE — G8420 CALC BMI NORM PARAMETERS: HCPCS | Performed by: FAMILY MEDICINE

## 2019-12-05 PROCEDURE — 4040F PNEUMOC VAC/ADMIN/RCVD: CPT | Performed by: FAMILY MEDICINE

## 2019-12-05 PROCEDURE — 1123F ACP DISCUSS/DSCN MKR DOCD: CPT | Performed by: FAMILY MEDICINE

## 2019-12-05 PROCEDURE — 90653 IIV ADJUVANT VACCINE IM: CPT | Performed by: FAMILY MEDICINE

## 2019-12-05 RX ORDER — OXYCODONE HYDROCHLORIDE AND ACETAMINOPHEN 5; 325 MG/1; MG/1
1 TABLET ORAL EVERY 8 HOURS PRN
Qty: 90 TABLET | Refills: 0 | Status: SHIPPED | OUTPATIENT
Start: 2019-12-05 | End: 2020-01-09

## 2019-12-05 ASSESSMENT — ENCOUNTER SYMPTOMS
SHORTNESS OF BREATH: 0
ABDOMINAL PAIN: 0
COUGH: 0
CHEST TIGHTNESS: 0
BACK PAIN: 1
BLOOD IN STOOL: 0

## 2020-01-09 RX ORDER — OXYCODONE HYDROCHLORIDE AND ACETAMINOPHEN 5; 325 MG/1; MG/1
TABLET ORAL
Qty: 90 TABLET | Refills: 0 | Status: SHIPPED | OUTPATIENT
Start: 2020-01-09 | End: 2020-02-04

## 2020-02-06 RX ORDER — OXYCODONE HYDROCHLORIDE AND ACETAMINOPHEN 5; 325 MG/1; MG/1
TABLET ORAL
Qty: 90 TABLET | Refills: 0 | Status: SHIPPED | OUTPATIENT
Start: 2020-02-06 | End: 2020-03-06 | Stop reason: SDUPTHER

## 2020-02-06 NOTE — TELEPHONE ENCOUNTER
Marry Showers called. Said Pt will run out of oxyCODONE-acetaminophen (PERCOCET) 5-325 MG on Saturday. Last Seen: 12/5/2019    Last Written: 1/9/20 90 tab R-0    Last UDS: do not see one    OARRS Run On: 9/9/19    Med Agreement Signed On: do not see one  Next Appointment: 4/6/2020    Requested Prescriptions     Pending Prescriptions Disp Refills    oxyCODONE-acetaminophen (PERCOCET) 5-325 MG per tablet [Pharmacy Med Name: OXYCOD/ACETAMINO 5-325 MG TABS] 90 tablet 0     Sig: TAKE 1 TABLET BY MOUTH EVERY 8 HOURS AS NEEDED FOR PAIN FOR UP TO 30 DAYS.  - REDUCE DOSES TAKEN AS PAIN BECOMES MANAGEABLE

## 2020-03-06 RX ORDER — OXYCODONE HYDROCHLORIDE AND ACETAMINOPHEN 5; 325 MG/1; MG/1
1 TABLET ORAL EVERY 8 HOURS PRN
Qty: 90 TABLET | Refills: 0 | Status: SHIPPED | OUTPATIENT
Start: 2020-03-06 | End: 2020-04-06

## 2020-03-24 ENCOUNTER — E-VISIT (OUTPATIENT)
Dept: FAMILY MEDICINE CLINIC | Age: 85
End: 2020-03-24
Payer: MEDICARE

## 2020-03-24 PROCEDURE — 99421 OL DIG E/M SVC 5-10 MIN: CPT | Performed by: FAMILY MEDICINE

## 2020-03-24 NOTE — PROGRESS NOTES
I am assuming this the visit was instead of coming in for 3-month checkup. She seems to be doing okay on her medication without any new problems. We will continue medications as needed and let me know when you are in need of a refill. I would suggest that we set up an appointment for about 2 months from now.

## 2020-04-06 RX ORDER — OXYCODONE HYDROCHLORIDE AND ACETAMINOPHEN 5; 325 MG/1; MG/1
TABLET ORAL
Qty: 90 TABLET | Refills: 0 | Status: SHIPPED | OUTPATIENT
Start: 2020-04-06 | End: 2020-05-06

## 2020-05-06 RX ORDER — OXYCODONE HYDROCHLORIDE AND ACETAMINOPHEN 5; 325 MG/1; MG/1
TABLET ORAL
Qty: 90 TABLET | Refills: 0 | Status: SHIPPED | OUTPATIENT
Start: 2020-05-06 | End: 2020-06-09

## 2020-07-07 NOTE — TELEPHONE ENCOUNTER
Refill Request - Controlled Substance    Last Seen: 12/5/2019       Last Written: #90  0rf  6/9/2020    Last UDS: no    OARRS Run On: 3/24/2020    Med Agreement Signed On: no    Next Appointment: Visit date not found        Requested Prescriptions     Pending Prescriptions Disp Refills    oxyCODONE-acetaminophen (PERCOCET) 5-325 MG per tablet [Pharmacy Med Name: OXYCOD/ACETAMINO 5-325 MG TABS] 90 tablet      Sig: TAKE 1 TABLET BY MOUTH EVERY 8 HOURS AS NEEDED FOR PAIN FOR UP TO 30 DAYS.  -REDUCE DOSES TAKEN AS PAIN BECOMES MANAGEABLE

## 2020-07-09 RX ORDER — OXYCODONE HYDROCHLORIDE AND ACETAMINOPHEN 5; 325 MG/1; MG/1
TABLET ORAL
Qty: 90 TABLET | Refills: 0 | Status: SHIPPED | OUTPATIENT
Start: 2020-07-09 | End: 2020-08-06

## 2020-07-09 NOTE — TELEPHONE ENCOUNTER
PT called asking about her oxyCODONE-acetaminophen and she wanted me to let Dr. Maribel Allen know that she just ran out today

## 2020-07-23 ENCOUNTER — E-VISIT (OUTPATIENT)
Dept: FAMILY MEDICINE CLINIC | Age: 85
End: 2020-07-23

## 2020-07-23 PROCEDURE — 99421 OL DIG E/M SVC 5-10 MIN: CPT | Performed by: FAMILY MEDICINE

## 2020-08-06 RX ORDER — OXYCODONE HYDROCHLORIDE AND ACETAMINOPHEN 5; 325 MG/1; MG/1
TABLET ORAL
Qty: 90 TABLET | Refills: 0 | Status: SHIPPED | OUTPATIENT
Start: 2020-08-06 | End: 2020-09-03

## 2020-09-03 RX ORDER — OXYCODONE HYDROCHLORIDE AND ACETAMINOPHEN 5; 325 MG/1; MG/1
TABLET ORAL
Qty: 90 TABLET | Refills: 0 | Status: SHIPPED | OUTPATIENT
Start: 2020-09-03 | End: 2020-10-07

## 2020-10-05 RX ORDER — MISOPROSTOL 200 UG/1
TABLET ORAL
Qty: 60 TABLET | Refills: 5 | Status: SHIPPED | OUTPATIENT
Start: 2020-10-05 | End: 2021-09-08

## 2020-10-07 RX ORDER — OXYCODONE HYDROCHLORIDE AND ACETAMINOPHEN 5; 325 MG/1; MG/1
TABLET ORAL
Qty: 90 TABLET | Refills: 0 | Status: SHIPPED | OUTPATIENT
Start: 2020-10-07 | End: 2020-11-06

## 2020-10-07 NOTE — TELEPHONE ENCOUNTER
Refill Request - Controlled Substance    Last Seen: 7/23/2020       Last Written: 9/3/2020    Last UDS: No UDS on File at this time. Last Oarrs: 3/24/2020    Med Agreement Signed On: 4/10/2016    Next Appointment: Visit date not found        Requested Prescriptions     Pending Prescriptions Disp Refills    oxyCODONE-acetaminophen (PERCOCET) 5-325 MG per tablet [Pharmacy Med Name: OXYCOD/ACETAMINO 5-325 MG TABS] 90 tablet      Sig: TAKE 1 TABLET BY MOUTH EVERY 8 HOURS AS NEEDED FOR PAIN FOR UP TO 30 DAYS.  -REDUCE DOSES TAKEN AS PAIN BECOMES MANAGEABLE

## 2020-11-05 NOTE — TELEPHONE ENCOUNTER
Refill Request - Controlled Substance    Last Seen: 7/23/2020       Last Written: #90  0rf  10/7/2020    Last UDS: no    Med Agreement Signed On: 4/21/2015    Next Appointment: Visit date not found        Requested Prescriptions     Pending Prescriptions Disp Refills    oxyCODONE-acetaminophen (PERCOCET) 5-325 MG per tablet [Pharmacy Med Name: OXYCOD/ACETAMINO 5-325 MG TABS] 90 tablet      Sig: TAKE 1 TABLET BY MOUTH EVERY 8 HOURS AS NEEDED FOR PAIN FOR UP TO 30 DAYS.  -REDUCE DOSES TAKEN AS PAIN BECOMES MANAGEABLE

## 2020-11-06 RX ORDER — OXYCODONE HYDROCHLORIDE AND ACETAMINOPHEN 5; 325 MG/1; MG/1
TABLET ORAL
Qty: 90 TABLET | Refills: 0 | Status: SHIPPED | OUTPATIENT
Start: 2020-11-06 | End: 2020-12-07

## 2020-11-09 ENCOUNTER — OFFICE VISIT (OUTPATIENT)
Dept: FAMILY MEDICINE CLINIC | Age: 85
End: 2020-11-09
Payer: MEDICARE

## 2020-11-09 VITALS
BODY MASS INDEX: 19.75 KG/M2 | DIASTOLIC BLOOD PRESSURE: 66 MMHG | TEMPERATURE: 97.2 F | HEART RATE: 70 BPM | HEIGHT: 61 IN | WEIGHT: 104.6 LBS | OXYGEN SATURATION: 91 % | SYSTOLIC BLOOD PRESSURE: 102 MMHG

## 2020-11-09 PROCEDURE — G0008 ADMIN INFLUENZA VIRUS VAC: HCPCS | Performed by: FAMILY MEDICINE

## 2020-11-09 PROCEDURE — 99214 OFFICE O/P EST MOD 30 MIN: CPT | Performed by: FAMILY MEDICINE

## 2020-11-09 PROCEDURE — 90694 VACC AIIV4 NO PRSRV 0.5ML IM: CPT | Performed by: FAMILY MEDICINE

## 2020-11-09 ASSESSMENT — ENCOUNTER SYMPTOMS
CHEST TIGHTNESS: 0
SHORTNESS OF BREATH: 1
BLOOD IN STOOL: 0
ABDOMINAL PAIN: 0
CONSTIPATION: 0
BACK PAIN: 1

## 2020-11-09 NOTE — PROGRESS NOTES
Vaccine Information Sheet, \"Influenza - Inactivated\"  given to Fain Bloch, or parent/legal guardian of  Fain Bloch and verbalized understanding. Patient responses:    Have you ever had a reaction to a flu vaccine? No  Do you have any current illness? No  Have you ever had Guillian [place] Syndrome? No  Do you have a serious allergy to any of the follow: Neomycin, Polymyxin, Thimerosal, eggs or egg products? No    Flu vaccine given per order. Please see immunization tab. Risks and benefits explained. Current VIS given.       Immunizations Administered     Name Date Dose Route    Influenza, Quadv, adjuvanted, 65 yrs +, IM, PF (Fluad) 11/9/2020 0.5 mL Intramuscular    Site: Deltoid- Left    Lot: [id number]    NDC: 57314-673-67

## 2020-11-09 NOTE — PROGRESS NOTES
Subjective:      Patient ID: Rosina Andersen is a 80 y.o. female. HPI  Patient in for check on several medical issues. Chronic pain-she is taking 3 Percocets a day spread out and uses Aspercreme on her knees which she states does a very good job--we have tried over the last 2 or 3 years substituting her pain medication with something that looks similar like a Tylenol but apparently she is coherent enough that she said that does not work-she does have vascular dementia and is in the ballpark when asked many mental exam questions. Hypertension-blood pressure 140/80 or below when she checks it at home or elsewhere she and the family deny any other issues to discuss. Prior to Visit Medications :  Medication oxyCODONE-acetaminophen (PERCOCET) 5-325 MG per tablet, Sig TAKE 1 TABLET BY MOUTH EVERY 8 HOURS AS NEEDED FOR PAIN FOR UP TO 30 DAYS. -REDUCE DOSES TAKEN AS PAIN BECOMES MANAGEABLE, Taking? Yes, Authorizing Provider Brandan Bradshaw, DO    Medication gabapentin (NEURONTIN) 300 MG capsule, Sig TAKE 1 CAPSULE AT BEDTIME, Taking? Yes, Authorizing Provider Brandan Bradshaw, DO    Medication miSOPROStol (CYTOTEC) 200 MCG tablet, Sig TAKE 1 TABLET DAILY, Taking? Yes, Authorizing Provider Brandan Bradshaw, DO    Medication enalapril (VASOTEC) 5 MG tablet, Sig TAKE 1 TABLET DAILY, Taking? Yes, Authorizing Provider Brandan Bradshaw, DO    Medication levothyroxine (SYNTHROID) 75 MCG tablet, Sig TAKE 1 TABLET DAILY, Taking? Yes, Authorizing Provider Brandan Bradshaw, DO    Medication albuterol sulfate  (90 Base) MCG/ACT inhaler, Sig Inhale 2 puffs into the lungs 4 times daily as needed for Wheezing, Taking? Yes, Authorizing Provider Sukhjinder Rucker MD    Medication diphenhydrAMINE (BENADRYL) 25 MG capsule, Sig Take 25 mg by mouth every 6 hours as needed for Itching or Sleep , Taking?  Yes, Authorizing Provider José Sylvester MD      Past Medical History:  No date: Depression  No date: Hyperlipidemia  No date: Hypertension  No date: Hypothyroidism  No date: Osteoarthritis        Review of Systems    Review of Systems   Constitutional: Negative for fever and unexpected weight change. HENT: Negative for congestion and postnasal drip. Respiratory: Positive for shortness of breath. Negative for chest tightness. Cardiovascular: Negative for chest pain and palpitations. Gastrointestinal: Negative for abdominal pain, blood in stool and constipation. Genitourinary: Positive for frequency. Negative for dysuria and hematuria. Musculoskeletal: Positive for arthralgias and back pain. Neurological: Negative for tremors and headaches. Psychiatric/Behavioral: Positive for sleep disturbance. Objective:   Physical Exam      Physical Exam  Constitutional:       Appearance: Normal appearance. She is normal weight. HENT:      Mouth/Throat:      Mouth: Mucous membranes are moist.      Pharynx: Oropharynx is clear. Eyes:      Conjunctiva/sclera: Conjunctivae normal.   Neck:      Vascular: No carotid bruit. Cardiovascular:      Rate and Rhythm: Normal rate and regular rhythm. Heart sounds: Normal heart sounds. Pulmonary:      Effort: Pulmonary effort is normal.      Breath sounds: Normal breath sounds. Abdominal:      Palpations: Abdomen is soft. Tenderness: There is no abdominal tenderness. Musculoskeletal:      Right lower leg: No edema. Left lower leg: No edema. Lymphadenopathy:      Cervical: No cervical adenopathy. Neurological:      Motor: Weakness present. Coordination: Coordination abnormal.      Gait: Gait abnormal.   Psychiatric:         Behavior: Behavior normal.      Comments: The Mini-Mental exam questions did take her a few seconds to answer but she did know who the president was she knows the month as either November or October. Assessment:       Diagnosis Orders   1. Chronic pain syndrome  Drug Panel-PM-HI Res-UR Interp-A   2. Essential hypertension     3. Vascular dementia without behavioral disturbance ([facility]           Plan:      Karn Collet was seen today for other. Diagnoses and all orders for this visit:    Chronic pain syndrome  -     Drug Panel-PM-HI Res-UR Interp-A  Continue medications as needed but the family will try substituting Tylenol for her pain medication slowly and at different times to try and eventually get her off of the pain medication-she can continue using the Aspercreme as needed since she does state that it does a great job for her knees. Essential hypertension  Continue medications and no added salt diet-keep weight down and staying as active as possible would be beneficial  Vascular dementia without behavioral disturbance ([facility]  She has not taking any medication for memory and I believe that was the family's choice not to put her on anything at the time it was offered.   Other orders  -     INFLUENZA, QUADV, ADJUVANTED, 65 YRS =, IM, PF, PREFILL SYR, 0.5ML (FLUAD)  See me 3 months          Brandan Bradshaw, DO

## 2020-11-12 LAB

## 2020-11-23 ENCOUNTER — TELEPHONE (OUTPATIENT)
Dept: FAMILY MEDICINE CLINIC | Age: 85
End: 2020-11-23

## 2020-11-23 NOTE — TELEPHONE ENCOUNTER
Belinda pts daughter in law called to let Dr. Shonda Parker know that she switch pts percocet with the tylenol for her 10pm pills. Belinda said that pt noticed right away that she switched the pills they tried for a week and Belinda said that pts hasnt slept since the day of her apt, is crying all night saying that her legs are hurting really bad. Belinda said that she is going to give pt her percocet tonight.

## 2020-12-07 RX ORDER — OXYCODONE HYDROCHLORIDE AND ACETAMINOPHEN 5; 325 MG/1; MG/1
TABLET ORAL
Qty: 90 TABLET | Refills: 0 | Status: SHIPPED | OUTPATIENT
Start: 2020-12-07 | End: 2021-01-04

## 2020-12-07 NOTE — TELEPHONE ENCOUNTER
Refill Request - Controlled Substance    Last Seen: 11/9/2020       Last Written: 11/6/2020 no refills    Last UDS: none on file    Med Agreement Signed On: none on file  Next Appointment: Visit date not found        Requested Prescriptions     Pending Prescriptions Disp Refills    oxyCODONE-acetaminophen (PERCOCET) 5-325 MG per tablet [Pharmacy Med Name: OXYCOD/ACETAMINO 5-325 MG TABS] 90 tablet      Sig: TAKE 1 TABLET BY MOUTH EVERY 8 HOURS AS NEEDED FOR PAIN FOR UP TO 30 DAYS.  -REDUCE DOSES TAKEN AS PAIN BECOMES MANAGEABLE

## 2021-01-04 DIAGNOSIS — G89.4 CHRONIC PAIN SYNDROME: ICD-10-CM

## 2021-01-04 NOTE — TELEPHONE ENCOUNTER
.  Last office visit 11/9/2020     Last written 12-7-2020 90 with 0      Next office visit scheduled Visit date not found    Requested Prescriptions     Pending Prescriptions Disp Refills    oxyCODONE-acetaminophen (PERCOCET) 5-325 MG per tablet [Pharmacy Med Name: OXYCOD/ACETAMINO 5-325 MG TABS] 90 tablet 0     Sig: TAKE 1 TABLET BY MOUTH EVERY 8 HOURS AS NEEDED FOR PAIN FOR UP TO 30 DAYS.  -REDUCE DOSES TAKEN AS PAIN BECOMES MANAGEABLE

## 2021-01-06 RX ORDER — OXYCODONE HYDROCHLORIDE AND ACETAMINOPHEN 5; 325 MG/1; MG/1
TABLET ORAL
Qty: 90 TABLET | Refills: 0 | Status: SHIPPED | OUTPATIENT
Start: 2021-01-06 | End: 2021-02-04

## 2021-02-02 DIAGNOSIS — G89.4 CHRONIC PAIN SYNDROME: ICD-10-CM

## 2021-02-02 NOTE — TELEPHONE ENCOUNTER
Refill Request - Controlled Substance    Last Seen: 11/9/2020       Last Written: 1/06/2021    Last UDS: 11/09/2020    Med Agreement Signed On: 4/10/2015    Next Appointment: Visit date not found        Requested Prescriptions     Pending Prescriptions Disp Refills    oxyCODONE-acetaminophen (PERCOCET) 5-325 MG per tablet [Pharmacy Med Name: OXYCOD/ACETAMINO 5-325 MG TABS] 90 tablet      Sig: TAKE 1 TABLET BY MOUTH EVERY 8 HOURS AS NEEDED FOR PAIN FOR UP TO 30 DAYS.  -REDUCE DOSES TAKEN AS PAIN BECOMES MANAGEABLE

## 2021-02-04 RX ORDER — OXYCODONE HYDROCHLORIDE AND ACETAMINOPHEN 5; 325 MG/1; MG/1
TABLET ORAL
Qty: 90 TABLET | Refills: 0 | Status: SHIPPED | OUTPATIENT
Start: 2021-02-04 | End: 2021-03-04

## 2021-03-16 ENCOUNTER — E-VISIT (OUTPATIENT)
Dept: FAMILY MEDICINE CLINIC | Age: 86
End: 2021-03-16

## 2021-03-19 ENCOUNTER — TELEPHONE (OUTPATIENT)
Dept: FAMILY MEDICINE CLINIC | Age: 86
End: 2021-03-19

## 2021-03-22 RX ORDER — GABAPENTIN 300 MG/1
CAPSULE ORAL
Qty: 90 CAPSULE | Refills: 3 | Status: SHIPPED | OUTPATIENT
Start: 2021-03-22 | End: 2022-05-24

## 2021-03-22 NOTE — TELEPHONE ENCOUNTER
Refill Request - Controlled Substance    Last Seen: 3/16/2021    Last Written: 10/12/20 90 capsule 1 refill    Last UDS: 11/9/20     Med Agreement Signed On: N/A for Gabapentin     Next Appointment: 5/27/2021    Future appointment scheduled    Requested Prescriptions     Pending Prescriptions Disp Refills    gabapentin (NEURONTIN) 300 MG capsule [Pharmacy Med Name: GABAPENTIN CAPS 300MG] 90 capsule 3     Sig: TAKE 1 CAPSULE AT BEDTIME

## 2021-03-24 NOTE — PROGRESS NOTES
Pt instructed to schedule appt with provider in 2 months. No Evisit completed.   Closed as no charge    Linnea Kent RN, BSN  Practice Manager - [facility]  Office - [phone]

## 2021-04-08 DIAGNOSIS — G89.4 CHRONIC PAIN SYNDROME: ICD-10-CM

## 2021-04-08 RX ORDER — OXYCODONE HYDROCHLORIDE AND ACETAMINOPHEN 5; 325 MG/1; MG/1
TABLET ORAL
Qty: 90 TABLET | Refills: 0 | Status: SHIPPED | OUTPATIENT
Start: 2021-04-08 | End: 2021-05-06

## 2021-04-08 NOTE — TELEPHONE ENCOUNTER
Refill Request - Controlled Substance    Last Seen: 3/16/2021    Last Written: 3/04/2021    Last UDS: 11/09/2020    Med Agreement Signed On: No Medication agreement on file for Oxycodone     Next Appointment: 5/27/2021    Future appointment scheduled    Requested Prescriptions     Pending Prescriptions Disp Refills    oxyCODONE-acetaminophen (PERCOCET) 5-325 MG per tablet [Pharmacy Med Name: OXYCOD/ACETAMINO 5-325 MG TABS] 90 tablet      Sig: TAKE 1 TABLET BY MOUTH EVERY 8 HOURS AS NEEDED FOR PAIN FOR UP TO 30 DAYS.  -REDUCE DOSES TAKEN AS PAIN BECOMES MANAGEABLE

## 2021-05-05 DIAGNOSIS — G89.4 CHRONIC PAIN SYNDROME: ICD-10-CM

## 2021-05-05 NOTE — TELEPHONE ENCOUNTER
Refill Request - Controlled Substance    Last Seen Department: 3/16/2021  Last Seen by PCP: 3/16/2021    Last Written: 4/8/2021    Last UDS: 11/9/2020    Med Agreement Signed On:     Next Appointment: 5/27/2021      Requested Prescriptions     Pending Prescriptions Disp Refills    oxyCODONE-acetaminophen (PERCOCET) 5-325 MG per tablet [Pharmacy Med Name: OXYCOD/ACETAMINO 5-325 MG TABS] 90 tablet      Sig: TAKE 1 TABLET BY MOUTH EVERY 8 HOURS AS NEEDED FOR PAIN FOR UP TO 30 DAYS -REDUCE DOSES TAKEN AS PAIN BECOMES MANAGEABLE

## 2021-05-06 RX ORDER — OXYCODONE HYDROCHLORIDE AND ACETAMINOPHEN 5; 325 MG/1; MG/1
TABLET ORAL
Qty: 90 TABLET | Refills: 0 | Status: SHIPPED | OUTPATIENT
Start: 2021-05-06 | End: 2021-06-07 | Stop reason: SDUPTHER

## 2021-06-07 ENCOUNTER — OFFICE VISIT (OUTPATIENT)
Dept: FAMILY MEDICINE CLINIC | Age: 86
End: 2021-06-07
Payer: MEDICARE

## 2021-06-07 VITALS
WEIGHT: 90 LBS | SYSTOLIC BLOOD PRESSURE: 90 MMHG | BODY MASS INDEX: 17.01 KG/M2 | DIASTOLIC BLOOD PRESSURE: 58 MMHG | OXYGEN SATURATION: 80 % | HEART RATE: 88 BPM

## 2021-06-07 DIAGNOSIS — D64.9 ANEMIA, UNSPECIFIED TYPE: ICD-10-CM

## 2021-06-07 DIAGNOSIS — F01.50 VASCULAR DEMENTIA WITHOUT BEHAVIORAL DISTURBANCE (HCC): ICD-10-CM

## 2021-06-07 DIAGNOSIS — G89.4 CHRONIC PAIN SYNDROME: Primary | ICD-10-CM

## 2021-06-07 DIAGNOSIS — R53.82 CHRONIC FATIGUE: ICD-10-CM

## 2021-06-07 LAB
A/G RATIO: 1.7 (ref 1.1–2.2)
ALBUMIN SERPL-MCNC: 4.3 G/DL (ref 3.4–5)
ALP BLD-CCNC: 67 U/L (ref 40–129)
ALT SERPL-CCNC: 8 U/L (ref 10–40)
ANION GAP SERPL CALCULATED.3IONS-SCNC: 15 MMOL/L (ref 3–16)
AST SERPL-CCNC: 21 U/L (ref 15–37)
BASOPHILS ABSOLUTE: 0 K/UL (ref 0–0.2)
BASOPHILS RELATIVE PERCENT: 0.4 %
BILIRUB SERPL-MCNC: 0.3 MG/DL (ref 0–1)
BUN BLDV-MCNC: 28 MG/DL (ref 7–20)
CALCIUM SERPL-MCNC: 9.8 MG/DL (ref 8.3–10.6)
CHLORIDE BLD-SCNC: 99 MMOL/L (ref 99–110)
CO2: 26 MMOL/L (ref 21–32)
CREAT SERPL-MCNC: 1.6 MG/DL (ref 0.6–1.2)
EOSINOPHILS ABSOLUTE: 0.3 K/UL (ref 0–0.6)
EOSINOPHILS RELATIVE PERCENT: 4.4 %
FOLATE: 9.15 NG/ML (ref 4.78–24.2)
GFR AFRICAN AMERICAN: 37
GFR NON-AFRICAN AMERICAN: 31
GLOBULIN: 2.6 G/DL
GLUCOSE BLD-MCNC: 166 MG/DL (ref 70–99)
HCT VFR BLD CALC: 34.7 % (ref 36–48)
HEMOGLOBIN: 11.5 G/DL (ref 12–16)
LYMPHOCYTES ABSOLUTE: 2.1 K/UL (ref 1–5.1)
LYMPHOCYTES RELATIVE PERCENT: 29.3 %
MCH RBC QN AUTO: 34.2 PG (ref 26–34)
MCHC RBC AUTO-ENTMCNC: 33.1 G/DL (ref 31–36)
MCV RBC AUTO: 103.3 FL (ref 80–100)
MONOCYTES ABSOLUTE: 0.5 K/UL (ref 0–1.3)
MONOCYTES RELATIVE PERCENT: 6.7 %
NEUTROPHILS ABSOLUTE: 4.2 K/UL (ref 1.7–7.7)
NEUTROPHILS RELATIVE PERCENT: 59.2 %
PDW BLD-RTO: 12.5 % (ref 12.4–15.4)
PLATELET # BLD: 255 K/UL (ref 135–450)
PMV BLD AUTO: 10 FL (ref 5–10.5)
POTASSIUM SERPL-SCNC: 5.1 MMOL/L (ref 3.5–5.1)
RBC # BLD: 3.36 M/UL (ref 4–5.2)
SODIUM BLD-SCNC: 140 MMOL/L (ref 136–145)
TOTAL PROTEIN: 6.9 G/DL (ref 6.4–8.2)
TSH SERPL DL<=0.05 MIU/L-ACNC: 0.08 UIU/ML (ref 0.27–4.2)
VITAMIN B-12: 814 PG/ML (ref 211–911)
WBC # BLD: 7.1 K/UL (ref 4–11)

## 2021-06-07 PROCEDURE — 99213 OFFICE O/P EST LOW 20 MIN: CPT | Performed by: FAMILY MEDICINE

## 2021-06-07 RX ORDER — MEGESTROL ACETATE 20 MG/1
TABLET ORAL
Qty: 30 TABLET | Refills: 0 | Status: SHIPPED | OUTPATIENT
Start: 2021-06-07 | End: 2022-03-26

## 2021-06-07 RX ORDER — OXYCODONE HYDROCHLORIDE AND ACETAMINOPHEN 5; 325 MG/1; MG/1
1 TABLET ORAL EVERY 8 HOURS PRN
Qty: 90 TABLET | Refills: 0 | Status: SHIPPED | OUTPATIENT
Start: 2021-06-07 | End: 2021-07-08

## 2021-06-07 ASSESSMENT — ENCOUNTER SYMPTOMS
ABDOMINAL PAIN: 0
CONSTIPATION: 0
BLOOD IN STOOL: 0
COUGH: 0
APNEA: 0
SHORTNESS OF BREATH: 0

## 2021-06-07 NOTE — PATIENT INSTRUCTIONS
Chronic pain syndrome  -     oxyCODONE-acetaminophen (PERCOCET) 5-325 MG per tablet; Take 1 tablet by mouth every 8 hours as needed for Pain for up to 30 days. Continue medication as neededtry and reduce the dose when possible by giving a half a tablet. Vascular dementia without behavioral disturbance (HCC)  -     CBC Auto Differential  -     Comprehensive Metabolic Panel  -     Vitamin B12 & Folate  Lab work todayif labs are okay we will consider medication which unfortunately may be a little late in the treatment. Anemia, unspecified type  -     CBC Auto Differential  Lab today  Chronic fatigue  -     TSH without Reflex  Lab today  Other orders  -     megestrol (MEGACE) 20 MG tablet; 1 daily for 5 days then start 2 daily  Prescription sent for Megace which we will probably have to adjust the dosage to try and help with her appetite. This is been a 20 to 25-minute visit with the patient.     See me 6 months        Brandan Bradshaw,

## 2021-06-07 NOTE — PROGRESS NOTES
needed for Itching or Sleep , Taking? Yes, Authorizing Provider Historical Provider, MD    Medication albuterol sulfate  (90 Base) MCG/ACT inhaler, Sig Inhale 2 puffs into the lungs 4 times daily as needed for Wheezing  Patient not taking: Reported on 6/7/2021, Taking? , Authorizing Provider Hanna Tse MD      Past Medical History:  No date: Depression  No date: Hyperlipidemia  No date: Hypertension  No date: Hypothyroidism  No date: Osteoarthritis        Review of Systems    Review of Systems   Constitutional: Negative for fever. Family answers most of the questionsshe basically answers I am doing fine and nothing is bothering me. HENT: Negative for congestion and postnasal drip. Respiratory: Negative for apnea, cough and shortness of breath. Cardiovascular: Negative for chest pain and leg swelling. Gastrointestinal: Negative for abdominal pain, blood in stool and constipation. Genitourinary: Positive for frequency. Negative for hematuria. Musculoskeletal: Positive for arthralgias. Neurological: Negative for tremors. Psychiatric/Behavioral: Negative for sleep disturbance. The patient is not nervous/anxious. Objective:   Physical Exam      Physical Exam  Constitutional:       Appearance: Normal appearance. Eyes:      Conjunctiva/sclera: Conjunctivae normal.   Neck:      Vascular: No carotid bruit. Cardiovascular:      Rate and Rhythm: Normal rate and regular rhythm. Heart sounds: Normal heart sounds. Pulmonary:      Effort: Pulmonary effort is normal.      Breath sounds: Normal breath sounds. Abdominal:      Palpations: Abdomen is soft. Tenderness: There is no abdominal tenderness. Musculoskeletal:      Right lower leg: No edema. Left lower leg: No edema. Lymphadenopathy:      Cervical: No cervical adenopathy. Skin:     General: Skin is warm and dry. Neurological:      General: No focal deficit present.       Comments: She uses a walker in the office   Psychiatric:      Comments: Unable to answer any basic Mini-Mental exam questions         Assessment:      1. Chronic pain syndrome    2. Vascular dementia without behavioral disturbance ([address]    3. Anemia, unspecified type    4. Chronic fatigue            Plan:      Janeen Taylor was seen today for 3 month follow-up and medication refill. Diagnoses and all orders for this visit:    Chronic pain syndrome  -     oxyCODONE-acetaminophen (PERCOCET) 5-325 MG per tablet; Take 1 tablet by mouth every 8 hours as needed for Pain for up to 30 days. Continue medication as neededtry and reduce the dose when possible by giving a half a tablet. Vascular dementia without behavioral disturbance (HCC)  -     CBC Auto Differential  -     Comprehensive Metabolic Panel  -     Vitamin B12 & Folate  Lab work todayif labs are okay we will consider medication which unfortunately may be a little late in the treatment. Anemia, unspecified type  -     CBC Auto Differential  Lab today  Chronic fatigue  -     TSH without Reflex  Lab today  Other orders  -     megestrol (MEGACE) 20 MG tablet; 1 daily for 5 days then start 2 daily  Prescription sent for Megace which we will probably have to adjust the dosage to try and help with her appetite. This is been a 20 to 25-minute visit with the patient.     See me 6 months        Brandan Bradshaw, DO

## 2021-06-08 ENCOUNTER — TELEPHONE (OUTPATIENT)
Dept: ADMINISTRATIVE | Age: 86
End: 2021-06-08

## 2021-06-08 DIAGNOSIS — R73.9 HYPERGLYCEMIA: Primary | ICD-10-CM

## 2021-06-08 LAB
ESTIMATED AVERAGE GLUCOSE: 119.8 MG/DL
HBA1C MFR BLD: 5.8 %

## 2021-06-08 NOTE — RESULT ENCOUNTER NOTE
Please call lab and add A1c which I just ordered. Tell her labs are okay except kidney tests are slightly elevated probably due to mild dehydration-sugar was up some so I just ordered another test to make sure she is not diabetic. Thyroid is a little too high so tell the family to reduce her thyroid medication to 6/week-just skip 1 day a week and we will recheck later on.

## 2021-07-07 DIAGNOSIS — G89.4 CHRONIC PAIN SYNDROME: ICD-10-CM

## 2021-07-07 NOTE — TELEPHONE ENCOUNTER
Refill Request - Controlled Substance    Last Seen Department: 6/7/2021  Last Seen by PCP: 6/7/2021    Last Written: 6/7/2021    Last UDS: 11/9/2020    Med Agreement Signed On: 4/10/2015    Next Appointment: 10/13/2021        Requested Prescriptions     Pending Prescriptions Disp Refills    oxyCODONE-acetaminophen (PERCOCET) 5-325 MG per tablet [Pharmacy Med Name: OXYCOD/ACETAMINO 5-325 MG TABS] 90 tablet      Sig: TAKE 1 TABLET BY MOUTH EVERY 8 HOURS AS NEEDED FOR PAIN FOR UP TO 30 DAYS.  - REDUCE DOSES TAKEN AS PAIN BECOMES MANAGEABLE

## 2021-07-08 RX ORDER — OXYCODONE HYDROCHLORIDE AND ACETAMINOPHEN 5; 325 MG/1; MG/1
TABLET ORAL
Qty: 90 TABLET | Refills: 0 | Status: SHIPPED | OUTPATIENT
Start: 2021-07-08 | End: 2021-08-17

## 2021-08-11 NOTE — PROGRESS NOTES
RESPIRATORY THERAPY ASSESSMENT    Name:  Madelin [facility] Record Number:  [id number]  Age: 80 y.o. Gender: female  : 1935  Today's Date:  2019  Room:  /0322-01    Assessment     Is the patient being admitted for a COPD or Asthma exacerbation? No   (If yes the patient will be seen every 4 hours for the first 24 hours and then reassessed)    Patient Admission Diagnosis      Allergies  Allergies   Allergen Reactions    Aspirin Other (See Comments)     Gastric pain       Minimum Predicted Vital Capacity:     680          Actual Vital Capacity:      Pt unable to perform              Pulmonary History:No history  Home Oxygen Therapy:  room air  Home Respiratory Therapy:None   Current Respiratory Therapy:  duoneb q4wa, albuterol q2 prn          Respiratory Severity Index(RSI)   Patients with orders for inhalation medications, oxygen, or any therapeutic treatment modality will be placed on Respiratory Protocol. They will be assessed with the first treatment and at least every 72 hours thereafter. The following severity scale will be used to determine frequency of treatment intervention.     Smoking History: No Smoking History = 0    Social History  Social History     Tobacco Use    Smoking status: Never Smoker    Smokeless tobacco: Never Used   Substance Use Topics    Alcohol use: No    Drug use: No       Recent Surgical History: None = 0  Past Surgical History  Past Surgical History:   Procedure Laterality Date    INTERTROCHANTERIC HIP FRACTURE SURGERY  3/13    Lt    JOINT REPLACEMENT      Rt hip    JOINT REPLACEMENT      Rt knee       Level of Consciousness: Alert, Oriented, and Cooperative = 0    Level of Activity: Non weight bearing- transfers bed to chair only = 3    Respiratory Pattern: Regular Pattern; RR 8-20 = 0    Breath Sounds: Diminshed bilaterally and/or crackles = 2    Sputum  Sputum Color: None,  ,    Cough: Strong, spontaneous, non-productive = 0    Vital Signs   BP demonstrate proper use of MDI with spacer     d. RR > 30 bpm   5. Bronchodilators will be delivered via Metered Dose Inhaler (MDI), HHN, Aerogen to intubated patients on mechanical ventilation. 6. Inhalation medication orders will be delivered and/or substituted as outlined below. Aerosolized Medications Ordering and Administration Guidelines:    1. All Medications will be ordered by a physician, and their frequency and/or modality will be adjusted as defined by the patients Respiratory Severity Index (RSI) score. 2. If the patient does not have documented COPD, consider discontinuing anticholinergics when RSI is less than 9.  3. If the bronchospasm worsens (increased RSI), then the bronchodilator frequency can be increased to a maximum of every 4 hours. If greater than every 4 hours is required, the physician will be contacted. 4. If the bronchospasm improves, the frequency of the bronchodilator can be decreased, based on the patient's RSI, but not less than home treatment regimen frequency. 5. Bronchodilator(s) will be discontinued if patient has a RSI less than 9 and has received no scheduled or as needed treatment for 72  Hrs. Patients Ordered on a Mucolytic Agent:    1. Must always be administered with a bronchodilator. 2. Discontinue if patient experiences worsened bronchospasm, or secretions have lessened to the point that the patient is able to clear them with a cough. Anti-inflammatory and Combination Medications:    1. If the patient lacks prior history of lung disease, is not using inhaled anti-inflammatory medication at home, and lacks wheezing by examination or by history for at least 24 hours, contact physician for possible discontinuation. W Plasty Text: The lesion was extirpated to the level of the fat with a #15 scalpel blade.  Given the location of the defect, shape of the defect and the proximity to free margins a W-plasty was deemed most appropriate for repair.  Using a sterile surgical marker, the appropriate transposition arms of the W-plasty were drawn incorporating the defect and placing the expected incisions within the relaxed skin tension lines where possible.    The area thus outlined was incised deep to adipose tissue with a #15 scalpel blade.  The skin margins were undermined to an appropriate distance in all directions utilizing iris scissors.  The opposing transposition arms were then transposed into place in opposite direction and anchored with interrupted buried subcutaneous sutures.

## 2021-08-16 DIAGNOSIS — G89.4 CHRONIC PAIN SYNDROME: ICD-10-CM

## 2021-08-16 NOTE — TELEPHONE ENCOUNTER
Refill Request - Controlled Substance    Last Seen Department: 6/7/2021  Last Seen by PCP: 6/7/2021    Last Written: 7/8/2021    Last UDS: 11/9/2020    Med Agreement Signed On: 4/10/2015    Next Appointment: 10/13/2021      Requested Prescriptions     Pending Prescriptions Disp Refills    oxyCODONE-acetaminophen (PERCOCET) 5-325 MG per tablet [Pharmacy Med Name: OXYCOD/ACETAMINO 5-325 MG TABS] 90 tablet      Sig: TAKE 1 TABLET BY MOUTH EVERY 8 HOURS AS NEEDED FOR PAIN FOR UP TO 30 DAYS.  - REDUCE DOSES TAKEN AS PAIN BECOMES MANAGEABLE

## 2021-08-17 RX ORDER — OXYCODONE HYDROCHLORIDE AND ACETAMINOPHEN 5; 325 MG/1; MG/1
TABLET ORAL
Qty: 90 TABLET | Refills: 0 | Status: SHIPPED | OUTPATIENT
Start: 2021-08-17 | End: 2021-09-15

## 2021-09-08 RX ORDER — MISOPROSTOL 200 UG/1
TABLET ORAL
Qty: 60 TABLET | Refills: 5 | Status: SHIPPED | OUTPATIENT
Start: 2021-09-08

## 2021-09-08 NOTE — TELEPHONE ENCOUNTER
Refill Request     Last Seen: Last Seen Department: 6/7/2021  Last Seen by PCP: 6/7/2021    Last Written: 10/5/2020  Next Appointment:   Future Appointments   Date Time Provider Jose M Ivory   10/13/2021 10:30 AM DO MARSHALL [place]ee [place] - [facility]       Future appointment scheduled      Requested Prescriptions     Pending Prescriptions Disp Refills    miSOPROStol (CYTOTEC) 200 MCG tablet [Pharmacy Med Name: MISOPROSTOL TABS 60'S 200MCG] 60 tablet 5     Sig: TAKE 1 TABLET DAILY

## 2021-09-15 DIAGNOSIS — G89.4 CHRONIC PAIN SYNDROME: ICD-10-CM

## 2021-09-15 NOTE — TELEPHONE ENCOUNTER
Refill Request - Controlled Substance    Last Seen Department: 6/7/2021  Last Seen by PCP: 6/7/2021    Last Written: 8/17/2021    Last UDS: 11/9/2020    Med Agreement Signed On: 4/21/2015    Next Appointment: 10/13/2021    Future appointment scheduled    Requested Prescriptions     Pending Prescriptions Disp Refills    oxyCODONE-acetaminophen (PERCOCET) 5-325 MG per tablet [Pharmacy Med Name: OXYCOD/ACETAMINO 5-325 MG TABS] 90 tablet 0     Sig: TAKE 1 TABLET BY MOUTH EVERY 8 HOURS AS NEEDED FOR PAIN FOR UP TO 30 DAYS.  - REDUCE DOSES TAKEN AS PAIN BECOMES MANAGEABLE

## 2021-09-16 RX ORDER — OXYCODONE HYDROCHLORIDE AND ACETAMINOPHEN 5; 325 MG/1; MG/1
TABLET ORAL
Qty: 90 TABLET | Refills: 0 | Status: SHIPPED | OUTPATIENT
Start: 2021-09-16 | End: 2021-10-19

## 2021-10-18 DIAGNOSIS — G89.4 CHRONIC PAIN SYNDROME: ICD-10-CM

## 2021-10-18 NOTE — TELEPHONE ENCOUNTER
Refill Request - Controlled Substance    Last Seen Department: 6/7/2021  Last Seen by PCP: 6/7/21     Last Written: 9/16/21 90 tablet 0 refill     Last UDS: 11/9/20     Med Agreement Signed On: 4/21/15    Next Appointment: 10/26/2021      Future appointment scheduled    Requested Prescriptions     Pending Prescriptions Disp Refills    oxyCODONE-acetaminophen (PERCOCET) 5-325 MG per tablet [Pharmacy Med Name: OXYCOD/ACETAMINO 5-325 MG TABS] 90 tablet      Sig: TAKE 1 TABLET BY MOUTH EVERY 8 HOURS AS NEEDED FOR PAIN FOR UP TO 30 DAYS.  - REDUCE DOSES TAKEN AS PAIN BECOMES MANAGEABLE

## 2021-10-19 RX ORDER — OXYCODONE HYDROCHLORIDE AND ACETAMINOPHEN 5; 325 MG/1; MG/1
TABLET ORAL
Qty: 90 TABLET | Refills: 0 | Status: SHIPPED | OUTPATIENT
Start: 2021-10-19 | End: 2021-11-22

## 2021-10-26 ENCOUNTER — OFFICE VISIT (OUTPATIENT)
Dept: FAMILY MEDICINE CLINIC | Age: 86
End: 2021-10-26
Payer: MEDICARE

## 2021-10-26 VITALS — HEART RATE: 69 BPM | DIASTOLIC BLOOD PRESSURE: 70 MMHG | SYSTOLIC BLOOD PRESSURE: 130 MMHG | OXYGEN SATURATION: 97 %

## 2021-10-26 DIAGNOSIS — N17.9 AKI (ACUTE KIDNEY INJURY) (HCC): ICD-10-CM

## 2021-10-26 DIAGNOSIS — E03.9 ACQUIRED HYPOTHYROIDISM: ICD-10-CM

## 2021-10-26 DIAGNOSIS — F05 SUNDOWNING: ICD-10-CM

## 2021-10-26 DIAGNOSIS — F01.50 VASCULAR DEMENTIA WITHOUT BEHAVIORAL DISTURBANCE (HCC): Primary | ICD-10-CM

## 2021-10-26 DIAGNOSIS — Z23 NEED FOR INFLUENZA VACCINATION: ICD-10-CM

## 2021-10-26 DIAGNOSIS — G89.4 CHRONIC PAIN SYNDROME: ICD-10-CM

## 2021-10-26 PROCEDURE — 3288F FALL RISK ASSESSMENT DOCD: CPT | Performed by: STUDENT IN AN ORGANIZED HEALTH CARE EDUCATION/TRAINING PROGRAM

## 2021-10-26 PROCEDURE — 90694 VACC AIIV4 NO PRSRV 0.5ML IM: CPT | Performed by: STUDENT IN AN ORGANIZED HEALTH CARE EDUCATION/TRAINING PROGRAM

## 2021-10-26 PROCEDURE — G0008 ADMIN INFLUENZA VIRUS VAC: HCPCS | Performed by: STUDENT IN AN ORGANIZED HEALTH CARE EDUCATION/TRAINING PROGRAM

## 2021-10-26 PROCEDURE — 99214 OFFICE O/P EST MOD 30 MIN: CPT | Performed by: STUDENT IN AN ORGANIZED HEALTH CARE EDUCATION/TRAINING PROGRAM

## 2021-10-26 RX ORDER — ENALAPRIL MALEATE 5 MG/1
5 TABLET ORAL DAILY
COMMUNITY
End: 2021-10-26

## 2021-10-26 RX ORDER — LEVOTHYROXINE SODIUM 0.05 MG/1
50 TABLET ORAL DAILY
Qty: 30 TABLET | Refills: 1 | Status: SHIPPED | OUTPATIENT
Start: 2021-10-26 | End: 2021-12-07

## 2021-10-26 SDOH — ECONOMIC STABILITY: TRANSPORTATION INSECURITY
IN THE PAST 12 MONTHS, HAS THE LACK OF TRANSPORTATION KEPT YOU FROM MEDICAL APPOINTMENTS OR FROM GETTING MEDICATIONS?: NO

## 2021-10-26 SDOH — ECONOMIC STABILITY: INCOME INSECURITY: IN THE LAST 12 MONTHS, WAS THERE A TIME WHEN YOU WERE NOT ABLE TO PAY THE MORTGAGE OR RENT ON TIME?: NO

## 2021-10-26 SDOH — ECONOMIC STABILITY: HOUSING INSECURITY: IN THE LAST 12 MONTHS, HOW MANY PLACES HAVE YOU LIVED?: 1

## 2021-10-26 SDOH — ECONOMIC STABILITY: FOOD INSECURITY: WITHIN THE PAST 12 MONTHS, THE FOOD YOU BOUGHT JUST DIDN'T LAST AND YOU DIDN'T HAVE MONEY TO GET MORE.: NEVER TRUE

## 2021-10-26 SDOH — ECONOMIC STABILITY: HOUSING INSECURITY
IN THE LAST 12 MONTHS, WAS THERE A TIME WHEN YOU DID NOT HAVE A STEADY PLACE TO SLEEP OR SLEPT IN A SHELTER (INCLUDING NOW)?: NO

## 2021-10-26 SDOH — ECONOMIC STABILITY: TRANSPORTATION INSECURITY
IN THE PAST 12 MONTHS, HAS LACK OF TRANSPORTATION KEPT YOU FROM MEETINGS, WORK, OR FROM GETTING THINGS NEEDED FOR DAILY LIVING?: NO

## 2021-10-26 SDOH — ECONOMIC STABILITY: FOOD INSECURITY: WITHIN THE PAST 12 MONTHS, YOU WORRIED THAT YOUR FOOD WOULD RUN OUT BEFORE YOU GOT MONEY TO BUY MORE.: NEVER TRUE

## 2021-10-26 ASSESSMENT — SOCIAL DETERMINANTS OF HEALTH (SDOH): HOW HARD IS IT FOR YOU TO PAY FOR THE VERY BASICS LIKE FOOD, HOUSING, MEDICAL CARE, AND HEATING?: NOT HARD AT ALL

## 2021-10-26 NOTE — PROGRESS NOTES
Vaccine Information Sheet, \"Influenza - Inactivated\"  given to Miri Nolan, or parent/legal guardian of  Miri Nolan and verbalized understanding. Patient responses:    Have you ever had a reaction to a flu vaccine? No  Do you have any current illness? No  Have you ever had [place] Syndrome? No  Do you have a serious allergy to any of the follow: Neomycin, Polymyxin, Thimerosal, eggs or egg products? No    Flu vaccine given per order. Please see immunization tab. Risks and benefits explained. Current VIS given.       Immunizations Administered     Name Date Dose Route    Influenza, Quadv, adjuvanted, 65 yrs +, IM, PF (Fluad) 10/26/2021 0.5 mL Intramuscular    Site: Deltoid- Left    Lot: [id number]    NDC: 41425-145-81

## 2021-10-26 NOTE — PROGRESS NOTES
Patient: Ramon Goncalves is a 80 y.o. female who presents today with the following Chief Complaint(s):  Chief Complaint   Patient presents with    Medication Refill     4 onth follow up in order to discuss medcations     Immunizations     flu shot         HPI     Pain medication  Taking for a long time, has been using without side effects  Has tried replacing with tylenol previousl with significant increase in pain    Hypothyroid  On 75mcg    Sundowning  DIL reports significant issues with decreased sleep. Staying up at night watching TV and singing then sleeping during the day    Dementia  Dil reports slowly worsening. Decreased oral intake for both solids and liquids. Current Outpatient Medications   Medication Sig Dispense Refill    levothyroxine (SYNTHROID) 50 MCG tablet Take 1 tablet by mouth daily 30 tablet 1    oxyCODONE-acetaminophen (PERCOCET) 5-325 MG per tablet TAKE 1 TABLET BY MOUTH EVERY 8 HOURS AS NEEDED FOR PAIN FOR UP TO 30 DAYS. - REDUCE DOSES TAKEN AS PAIN BECOMES MANAGEABLE 90 tablet 0    miSOPROStol (CYTOTEC) 200 MCG tablet TAKE 1 TABLET DAILY 60 tablet 5    enalapril (VASOTEC) 5 MG tablet TAKE 1 TABLET DAILY 90 tablet 0    gabapentin (NEURONTIN) 300 MG capsule TAKE 1 CAPSULE AT BEDTIME 90 capsule 3    megestrol (MEGACE) 20 MG tablet 1 daily for 5 days then start 2 daily (Patient not taking: Reported on 10/26/2021) 30 tablet 0     No current facility-administered medications for this visit. Patient's past medical history, surgical history, family history, medications,  andallergies  were all reviewed and updated as appropriate today. Review of Systems  All other systems reviewed and negative    Physical Exam  Vitals reviewed. Constitutional:       Appearance: Normal appearance. HENT:      Head: Normocephalic and atraumatic. Cardiovascular:      Rate and Rhythm: Normal rate and regular rhythm.    Pulmonary:      Effort: Pulmonary effort is normal.      Breath sounds: Normal breath sounds. Neurological:      General: No focal deficit present. Mental Status: She is alert and oriented to person, place, and time. Psychiatric:         Behavior: Behavior normal.         Thought Content: Thought content normal.       Vitals:    10/26/21 1346   BP: 130/70   Pulse: 69   SpO2: 97%       Assessment:  Encounter Diagnoses   Name Primary?  Vascular dementia without behavioral disturbance (HCC) Yes    Sundowning     GARRETT [facility]acute kidney [facility]) ([facility]     Acquired hypothyroidism     Chronic pain syndrome     Need for influenza vaccination        Plan:  1. [facility]  2. Sundowning  Patient with worsening dementia and sundowning. Discussed management strategies including addition of 5mg melatonin, consistent sleep wake times, adjustment of lights in home and keeping blinds up as much as possible during day. 3. GARRETT (acute kidney injury) ([facility]  Noted on prior lab work in June GFR 31. Previous lab work 2 years prior with GFR 53. Will recheck RFP to determine CKD vs GARRETT. - Renal Function Panel; Future    4. Acquired hypothyroidism  Noted TSH 0.08. Will decrease synthroid to 50mcg and recheck in 6 weeks. - levothyroxine (SYNTHROID) 50 MCG tablet; Take 1 tablet by mouth daily  Dispense: 30 tablet; Refill: 1  - TSH with Reflex; Future    5. Chronic pain syndrome  Stable on current pain medication regimen. Discussed concerns for constipation and other complications. Will monitor for change in bowel habits and start stool softener and contact office. Previously filled by Dr. Moses Del Rio. 6. Need for influenza vaccination  - INFLUENZA, QUADV, ADJUVANTED, 65 YRS =, IM, PF, PREFILL SYR, 0.5ML (FLUAD)        No follow-ups on file.

## 2021-10-27 DIAGNOSIS — E87.5 HYPERKALEMIA: Primary | ICD-10-CM

## 2021-10-27 LAB
ALBUMIN SERPL-MCNC: 4.4 G/DL (ref 3.4–5)
ANION GAP SERPL CALCULATED.3IONS-SCNC: 12 MMOL/L (ref 3–16)
BUN BLDV-MCNC: 21 MG/DL (ref 7–20)
CALCIUM SERPL-MCNC: 9.9 MG/DL (ref 8.3–10.6)
CHLORIDE BLD-SCNC: 99 MMOL/L (ref 99–110)
CO2: 27 MMOL/L (ref 21–32)
CREAT SERPL-MCNC: 1.4 MG/DL (ref 0.6–1.2)
GFR AFRICAN AMERICAN: 43
GFR NON-AFRICAN AMERICAN: 36
GLUCOSE BLD-MCNC: 134 MG/DL (ref 70–99)
PHOSPHORUS: 4.3 MG/DL (ref 2.5–4.9)
POTASSIUM SERPL-SCNC: 5.5 MMOL/L (ref 3.5–5.1)
SODIUM BLD-SCNC: 138 MMOL/L (ref 136–145)
T4 FREE: 1.9 NG/DL (ref 0.9–1.8)
TSH REFLEX: 0.1 UIU/ML (ref 0.27–4.2)

## 2021-11-01 ENCOUNTER — TELEPHONE (OUTPATIENT)
Dept: FAMILY MEDICINE CLINIC | Age: 86
End: 2021-11-01

## 2021-11-01 NOTE — TELEPHONE ENCOUNTER
----- Message from Remark sent at 10/29/2021  9:46 AM EDT -----  Subject: Results Request    QUESTIONS  Which lab or imaging result is the patient calling about? bloodwork   Which provider ordered the test?   At what location was the test performed? Date the test was performed? Additional Information for Provider?   ---------------------------------------------------------------------------  --------------  CALL BACK INFO  What is the best way for the office to contact you? Do not leave any   message, patient will call back for answer  Preferred Call Back Phone Number?  [phone]

## 2021-11-01 NOTE — TELEPHONE ENCOUNTER
I spoke with Zohra's daughter in law (on HIPAA) and she stated that their concern was Zohra's kidney function. She stated that it was low last time and they wanted to know what the results are and if there is anything to worry about.

## 2021-11-01 NOTE — TELEPHONE ENCOUNTER
Please find out what \"blood work\" means. Dr. Alyce Garcia addressed bloodwork  That was done 10/26/2021. She does have order for labwork Potassium to be drawn. Thanks, Wandy Perez

## 2021-11-02 NOTE — TELEPHONE ENCOUNTER
Per Dr. Tracie Roy note: Please let patient know that 10/26/2021 labs thyroid function is still elevated on recent lab work and recommend starting the new lower dose of synthroid that was sent in. Patients potassium was also slightly elevated but can sometimes be due to an issue with the lab. Would recommend repeating just a potassium level in the next week to make sure this is not still elevated. Thank you I just want to make them aware as these look like they were called to the patient. As far as Kidney function there has been slight improvement from last labs and is stable. She needs to make sure to drink water and stay hydrated. Try to avoid any NSAIDs like ibuprofen,advil, aleve as this can also impact kidney function. Any other issues or concerns, please follow up with PCP.  Thanks, Ankita Sanchez

## 2021-12-06 DIAGNOSIS — I10 ESSENTIAL HYPERTENSION: ICD-10-CM

## 2021-12-06 NOTE — TELEPHONE ENCOUNTER
Refill Request     Last Seen: Last Seen Department: 10/26/2021  Last Seen by PCP: 10/26/2021    Last Written: 9/7/2021    Next Appointment:   No future appointments.           Requested Prescriptions     Pending Prescriptions Disp Refills    enalapril (VASOTEC) 5 MG tablet [Pharmacy Med Name: ENALAPRIL MALEATE TABS 5MG] 90 tablet 1     Sig: TAKE 1 TABLET DAILY

## 2021-12-07 DIAGNOSIS — E03.9 ACQUIRED HYPOTHYROIDISM: ICD-10-CM

## 2021-12-07 RX ORDER — ENALAPRIL MALEATE 5 MG/1
TABLET ORAL
Qty: 90 TABLET | Refills: 1 | Status: SHIPPED | OUTPATIENT
Start: 2021-12-07 | End: 2022-06-07

## 2021-12-07 RX ORDER — LEVOTHYROXINE SODIUM 0.05 MG/1
TABLET ORAL
Qty: 30 TABLET | Refills: 11 | Status: SHIPPED | OUTPATIENT
Start: 2021-12-07

## 2021-12-07 NOTE — TELEPHONE ENCOUNTER
Refill Request     Last Seen: Last Seen Department: 10/26/2021  Last Seen by PCP: 10/26/2021    Last Written: 10/26/2021  Patient requesting a year supply      Next Appointment:   No future appointments.           Requested Prescriptions     Pending Prescriptions Disp Refills    levothyroxine (SYNTHROID) 50 MCG tablet [Pharmacy Med Name: L-THYROXINE (SYNTHROID) TABS 50MCG] 30 tablet 11     Sig: TAKE 1 TABLET DAILY

## 2021-12-23 DIAGNOSIS — G89.4 CHRONIC PAIN SYNDROME: ICD-10-CM

## 2021-12-23 NOTE — TELEPHONE ENCOUNTER
Refill Request - Controlled Substance    Last Seen Department: 10/26/2021  Last Seen by PCP: 7/26/21     Last Written: 11/22/21 90 tablet 0 refill     Last UDS: 11/9/20     Med Agreement Signed On: 4/21/15     Next Appointment:     Message to IMN to schedule appointment. Requested Prescriptions     Pending Prescriptions Disp Refills    oxyCODONE-acetaminophen (PERCOCET) 5-325 MG per tablet [Pharmacy Med Name: OXYCODONE-ACETAMINOPHEN 5-325 MG ORAL TABLET] 90 tablet      Sig: TAKE 1 TABLET BY MOUTH EVERY 8 HOURS AS NEEDED FOR PAIN FOR UP TO 30 DAYS.  - REDUCE DOSES TAKEN AS PAIN BECOMES MANAGEABLE

## 2021-12-24 RX ORDER — OXYCODONE HYDROCHLORIDE AND ACETAMINOPHEN 5; 325 MG/1; MG/1
TABLET ORAL
Qty: 90 TABLET | Refills: 0 | Status: SHIPPED | OUTPATIENT
Start: 2021-12-24 | End: 2022-01-26

## 2022-01-20 ENCOUNTER — TELEPHONE (OUTPATIENT)
Dept: FAMILY MEDICINE CLINIC | Age: 87
End: 2022-01-20

## 2022-01-20 NOTE — TELEPHONE ENCOUNTER
Gemma Waldron ( On hippa) calling because pt has been taking melatonin 5mg OTC but it is not working pt is on 4 nights without sleeping. Gemma Waldron is wondering if pt can get a medication that is a little stronger.  Please Advise

## 2022-01-20 NOTE — TELEPHONE ENCOUNTER
There is another medicine over-the-counter that she can try called doxylamine. She could try 12.5 to 25 mg. We could also consider trazodone which is a prescription medicine. Has she tried that? Let me know what she decides. Always remember good sleep hygiene. She should keep the room dark, cool and use the bed only for sleep. She should go to bed and wake up at the same time. Dr. Milan Smith is not in the office this month.

## 2022-01-24 NOTE — TELEPHONE ENCOUNTER
Left message [place] patients daughter in law baudilio Vergara per HIPAA, to call the office back to be informed.

## 2022-01-26 DIAGNOSIS — G89.4 CHRONIC PAIN SYNDROME: ICD-10-CM

## 2022-01-26 NOTE — TELEPHONE ENCOUNTER
.  Refill Request - Controlled Substance    Last Seen Department: 10/26/2021  Last Seen by PCP: Visit date not found    Last Written: 12-24-21 90 with 0     Last UDS: 11-9-2020    Med Agreement Signed On: 4-21-15    Next Appointment: Visit date not found      Requested Prescriptions     Pending Prescriptions Disp Refills    oxyCODONE-acetaminophen (PERCOCET) 5-325 MG per tablet [Pharmacy Med Name: OXYCODONE-ACETAMINOPHEN 5-325 MG ORAL TABLET] 90 tablet 0     Sig: TAKE 1 TABLET BY MOUTH EVERY 8 HOURS AS NEEDED FOR PAIN FOR UP TO 30 DAYS.  - REDUCE DOSES TAKEN AS PAIN BECOMES MANAGEABLE

## 2022-01-27 RX ORDER — OXYCODONE HYDROCHLORIDE AND ACETAMINOPHEN 5; 325 MG/1; MG/1
TABLET ORAL
Qty: 90 TABLET | Refills: 0 | Status: SHIPPED | OUTPATIENT
Start: 2022-01-27 | End: 2022-02-28

## 2022-01-27 RX ORDER — TRAZODONE HYDROCHLORIDE 50 MG/1
25-50 TABLET ORAL NIGHTLY PRN
Qty: 30 TABLET | Refills: 1 | Status: SHIPPED | OUTPATIENT
Start: 2022-01-27 | End: 2022-02-28

## 2022-01-27 NOTE — TELEPHONE ENCOUNTER
As far as marek knows Murali Velasquez has never taken trazadone. She would like to give this a try. They use [facility] pharmacy. Pended. Please advise.

## 2022-02-28 DIAGNOSIS — G89.4 CHRONIC PAIN SYNDROME: ICD-10-CM

## 2022-02-28 DIAGNOSIS — G89.4 CHRONIC PAIN SYNDROME: Primary | ICD-10-CM

## 2022-02-28 RX ORDER — OXYCODONE HYDROCHLORIDE AND ACETAMINOPHEN 5; 325 MG/1; MG/1
TABLET ORAL
Qty: 90 TABLET | Refills: 0 | Status: SHIPPED | OUTPATIENT
Start: 2022-02-28 | End: 2022-03-28

## 2022-02-28 RX ORDER — TRAZODONE HYDROCHLORIDE 50 MG/1
25-50 TABLET ORAL NIGHTLY PRN
Qty: 30 TABLET | Refills: 1 | Status: SHIPPED | OUTPATIENT
Start: 2022-02-28 | End: 2022-03-28

## 2022-02-28 NOTE — TELEPHONE ENCOUNTER
Refill Request - Controlled Substance    Last Seen Department: 10/26/2021  Last Seen by PCP: 6/7/2021    Last Written: 1/27/2022, #90, 0 refills    Last UDS: 11/9/2020    Med Agreement Signed On: 4/21/2015    Next Appointment: Visit date not found      Requested Prescriptions     Pending Prescriptions Disp Refills    oxyCODONE-acetaminophen (PERCOCET) 5-325 MG per tablet [Pharmacy Med Name: OXYCODONE-ACETAMINOPHEN 5-325 MG ORAL TABLET] 90 tablet      Sig: TAKE 1 TABLET BY MOUTH EVERY 8 HOURS AS NEEDED FOR PAIN FOR UP TO 30 DAYS.  - REDUCE DOSES TAKEN AS PAIN BECOMES MANAGEABLE

## 2022-02-28 NOTE — TELEPHONE ENCOUNTER
Refill Request     Last Seen: Last Seen Department: 10/26/2021  Last Seen by PCP: 6/7/21     Last Written: 1/27/22 30 tablet 1 refill     Next Appointment: 4/11/22     Future Appointments   Date Time Provider Jose M Ivory   4/11/2022  2:00 PM DO MARSHALL Kee [place] - [facility]       Future appointment scheduled      Requested Prescriptions     Pending Prescriptions Disp Refills    traZODone (DESYREL) 50 MG tablet [Pharmacy Med Name: TRAZODONE HCL 50 MG ORAL TABLET] 30 tablet 1     Sig: TAKE 0.5-1 TABLETS BY MOUTH NIGHTLY AS NEEDED FOR SLEEP

## 2022-03-14 ENCOUNTER — TELEMEDICINE (OUTPATIENT)
Dept: FAMILY MEDICINE CLINIC | Age: 87
End: 2022-03-14
Payer: MEDICARE

## 2022-03-14 DIAGNOSIS — Z00.00 MEDICARE ANNUAL WELLNESS VISIT, SUBSEQUENT: Primary | ICD-10-CM

## 2022-03-14 PROCEDURE — G0439 PPPS, SUBSEQ VISIT: HCPCS | Performed by: FAMILY MEDICINE

## 2022-03-14 ASSESSMENT — PATIENT HEALTH QUESTIONNAIRE - PHQ9
SUM OF ALL RESPONSES TO PHQ QUESTIONS 1-9: 0
SUM OF ALL RESPONSES TO PHQ QUESTIONS 1-9: 0
2. FEELING DOWN, DEPRESSED OR HOPELESS: 0
SUM OF ALL RESPONSES TO PHQ9 QUESTIONS 1 & 2: 0
SUM OF ALL RESPONSES TO PHQ QUESTIONS 1-9: 0
1. LITTLE INTEREST OR PLEASURE IN DOING THINGS: 0
SUM OF ALL RESPONSES TO PHQ QUESTIONS 1-9: 0

## 2022-03-14 ASSESSMENT — LIFESTYLE VARIABLES: HOW OFTEN DO YOU HAVE A DRINK CONTAINING ALCOHOL: NEVER

## 2022-03-14 NOTE — PATIENT INSTRUCTIONS
Personalized Preventive Plan for Ramana Molina - 3/14/2022  Medicare offers a range of preventive health benefits. Some of the tests and screenings are paid in full while other may be subject to a deductible, co-insurance, and/or copay. Some of these benefits include a comprehensive review of your medical history including lifestyle, illnesses that may run in your family, and various assessments and screenings as appropriate. After reviewing your medical record and screening and assessments performed today your provider may have ordered immunizations, labs, imaging, and/or referrals for you. A list of these orders (if applicable) as well as your Preventive Care list are included within your After Visit Summary for your review. Other Preventive Recommendations:    · A preventive eye exam performed by an eye specialist is recommended every 1-2 years to screen for glaucoma; cataracts, macular degeneration, and other eye disorders. · A preventive dental visit is recommended every 6 months. · Try to get at least 150 minutes of exercise per week or 10,000 steps per day on a pedometer . · Order or download the FREE \"Exercise & Physical Activity: Your Everyday Guide\" from [employer]. Call 1-448.481.5833 or search [employer] online. · You need 0020-0801 mg of calcium and 5213-0194 IU of vitamin D per day. It is possible to meet your calcium requirement with diet alone, but a vitamin D supplement is usually necessary to meet this goal.  · When exposed to the sun, use a sunscreen that protects against both UVA and UVB radiation with an SPF of 30 or greater. Reapply every 2 to 3 hours or after sweating, drying off with a towel, or swimming. · Always wear a seat belt when traveling in a car. Always wear a helmet when riding a bicycle or motorcycle.

## 2022-03-14 NOTE — PROGRESS NOTES
Medicare Annual Wellness Visit    Marjorie Rodas is here for Medicare AWV    Assessment & Plan   Medicare annual wellness visit, subsequent      Recommendations for Preventive Services Due: see orders and patient instructions/AVS.  Recommended screening schedule for the next 5-10 years is provided to the patient in written form: see Patient Instructions/AVS.     No follow-ups on file. Subjective       Patient's complete Health Risk Assessment and screening values have been reviewed and are found in Flowsheets. The following problems were reviewed today and where indicated follow up appointments were made and/or referrals ordered. Positive Risk Factor Screenings with Interventions:    Fall Risk:  Do you feel unsteady or are you worried about falling? : (!) yes  2 or more falls in past year?: no  Fall with injury in past year?: no     Fall Risk Interventions:    · Home safety tips provided    Cognitive: Words recalled: 0 Words Recalled  Clock Drawing Test (CDT): (!) Abnormal (could not spell the word world backwards)  Total Score Interpretation: Abnormal Mini-Cog    Cognitive Impairment Interventions:  · Patient advised to follow-up in this office for further evaluation and treatment within 1 month(s)          Opioid Risk: (Low risk score ? 55)  Opioid risk score: 14    Any opioid medication usage will be addressed by their licensed provider at a future visit.      Health Habits/Nutrition:     Physical Activity: Inactive    Days of Exercise per Week: 0 days    Minutes of Exercise per Session: 0 min     Have you lost any weight without trying in the past 3 months?: No     Have you seen the dentist within the past year?: N/A - wear dentures    Health Habits/Nutrition Interventions:  · Inadequate physical activity:  patient is not ready to increase his/her physical activity level at this time    Hearing/Vision:  Do you or your family notice any trouble with your hearing that hasn't been managed with hearing aids?: (!) Yes  Do you have difficulty driving, watching TV, or doing any of your daily activities because of your eyesight?: No  Have you had an eye exam within the past year?: (!) No  No exam data present    Hearing/Vision Interventions:  · Hearing concerns:  patient declines any further evaluation/treatment for hearing issues  · Vision concerns:  patient encouraged to make appointment with his/her eye specialist     ADLs:  In the past 7 days, did you need help from others to perform any of the following everyday activities: Eating, dressing, grooming, bathing, toileting, or walking/balance?: (!) Yes  Select all that apply: (!) Bathing,Dressing  In the past 7 days, did you need help from others to take care of any of the following: Laundry, housekeeping, banking/finances, shopping, telephone use, food preparation, transportation, or taking medications?: (!) Yes  Select all that apply: (!) Laundry,Housekeeping,Shopping,Transportation,Food Preparation,Taking Medications,Banking/Finances,Telephone Use (family does all for  pt)    ADL Interventions:  · Family would like to discuss possible referral to [facility] . Objective      Patient-Reported Vitals  Patient-Reported Weight: 90lb  Patient-Reported Height: 5' 1\"     Family does not monitor blood pressure      Allergies   Allergen Reactions    Aspirin Other (See Comments)     Gastric pain     Prior to Visit Medications    Medication Sig Taking? Authorizing Provider   oxyCODONE-acetaminophen (PERCOCET) 5-325 MG per tablet TAKE 1 TABLET BY MOUTH EVERY 8 HOURS AS NEEDED FOR PAIN FOR UP TO 30 DAYS.  - REDUCE DOSES TAKEN AS PAIN BECOMES MANAGEABLE Yes Brandan Bradshaw DO   traZODone (DESYREL) 50 MG tablet TAKE 0.5-1 TABLETS BY MOUTH NIGHTLY AS NEEDED FOR SLEEP Yes RADAMES Goins CNP   enalapril (VASOTEC) 5 MG tablet TAKE 1 TABLET DAILY Yes Irena Lucio   levothyroxine (SYNTHROID) 50 MCG tablet TAKE 1 TABLET DAILY Yes Marcin Millard DO   megestrol (MEGACE) 20 MG tablet 1 daily for 5 days then start 2 daily Yes Brandan Bradshaw DO   miSOPROStol (CYTOTEC) 200 MCG tablet TAKE 1 TABLET DAILY  Patient not taking: Reported on 3/14/2022  Michael Mishra DO   gabapentin (NEURONTIN) 300 MG capsule TAKE 1 CAPSULE AT BEDTIME  Shahnaz Blackman DO       CareTeam (Including outside providers/suppliers regularly involved in providing care):   Patient Care Team:  Vic Cervantes DO as PCP - General (Family Medicine)  Vic Cervantes [facility]CP [facility]    Reviewed and updated this visit:  Allergies  Meds            Basilia Goncalves, was evaluated through a synchronous (real-time) audio-video encounter. The patient (or guardian if applicable) is aware that this is a billable service, which includes applicable co-pays. This Virtual Visit was conducted with patient's (and/or legal guardian's) consent. The visit was conducted pursuant to the emergency declaration under the [address] [place] [address] authority and the [employer] Act. Patient identification was verified, and a caregiver was present when appropriate. The patient was located at home in a state where the provider was licensed to provide care. Jenae Shane LPN, [birth year], performed the documented evaluation under the direct supervision of the attending physician. This encounter was performed under [employer] DOs, direct supervision, 3/14/2022.

## 2022-03-26 ENCOUNTER — APPOINTMENT (OUTPATIENT)
Dept: CT IMAGING | Age: 87
DRG: 689 | End: 2022-03-26
Payer: MEDICARE

## 2022-03-26 ENCOUNTER — HOSPITAL ENCOUNTER (INPATIENT)
Age: 87
LOS: 2 days | Discharge: HOME HEALTH | DRG: 689 | End: 2022-03-28
Attending: STUDENT IN AN ORGANIZED HEALTH CARE EDUCATION/TRAINING PROGRAM
Payer: MEDICARE

## 2022-03-26 ENCOUNTER — APPOINTMENT (OUTPATIENT)
Dept: GENERAL RADIOLOGY | Age: 87
DRG: 689 | End: 2022-03-26
Payer: MEDICARE

## 2022-03-26 DIAGNOSIS — J96.02 ACUTE RESPIRATORY FAILURE WITH HYPOXIA AND HYPERCAPNIA (HCC): ICD-10-CM

## 2022-03-26 DIAGNOSIS — R41.82 ALTERED MENTAL STATUS, UNSPECIFIED ALTERED MENTAL STATUS TYPE: Primary | ICD-10-CM

## 2022-03-26 DIAGNOSIS — N39.0 URINARY TRACT INFECTION IN FEMALE: ICD-10-CM

## 2022-03-26 DIAGNOSIS — J96.01 ACUTE RESPIRATORY FAILURE WITH HYPOXIA AND HYPERCAPNIA (HCC): ICD-10-CM

## 2022-03-26 PROBLEM — R09.02 HYPOXIA: Status: ACTIVE | Noted: 2022-03-26

## 2022-03-26 LAB
A/G RATIO: 1.5 (ref 1.1–2.2)
ALBUMIN SERPL-MCNC: 4.7 G/DL (ref 3.4–5)
ALP BLD-CCNC: 75 U/L (ref 40–129)
ALT SERPL-CCNC: 18 U/L (ref 10–40)
AMMONIA: 18 UMOL/L (ref 11–51)
ANION GAP SERPL CALCULATED.3IONS-SCNC: 8 MMOL/L (ref 3–16)
AST SERPL-CCNC: 32 U/L (ref 15–37)
BASE EXCESS VENOUS: -1.1 MMOL/L (ref -3–3)
BASOPHILS ABSOLUTE: 0 K/UL (ref 0–0.2)
BASOPHILS RELATIVE PERCENT: 0.6 %
BILIRUB SERPL-MCNC: 0.6 MG/DL (ref 0–1)
BUN BLDV-MCNC: 31 MG/DL (ref 7–20)
CALCIUM SERPL-MCNC: 10.5 MG/DL (ref 8.3–10.6)
CARBOXYHEMOGLOBIN: 1.8 % (ref 0–1.5)
CHLORIDE BLD-SCNC: 98 MMOL/L (ref 99–110)
CO2: 32 MMOL/L (ref 21–32)
CREAT SERPL-MCNC: 1.3 MG/DL (ref 0.6–1.2)
EOSINOPHILS ABSOLUTE: 0.3 K/UL (ref 0–0.6)
EOSINOPHILS RELATIVE PERCENT: 5.2 %
GFR AFRICAN AMERICAN: 47
GFR NON-AFRICAN AMERICAN: 39
GLUCOSE BLD-MCNC: 105 MG/DL (ref 70–99)
HCO3 VENOUS: 27.5 MMOL/L (ref 23–29)
HCT VFR BLD CALC: 38.8 % (ref 36–48)
HEMOGLOBIN: 13 G/DL (ref 12–16)
INFLUENZA A: NOT DETECTED
INFLUENZA B: NOT DETECTED
LACTIC ACID, SEPSIS: 1 MMOL/L (ref 0.4–1.9)
LIPASE: 20 U/L (ref 13–60)
LYMPHOCYTES ABSOLUTE: 2.2 K/UL (ref 1–5.1)
LYMPHOCYTES RELATIVE PERCENT: 40 %
MCH RBC QN AUTO: 34.4 PG (ref 26–34)
MCHC RBC AUTO-ENTMCNC: 33.5 G/DL (ref 31–36)
MCV RBC AUTO: 102.6 FL (ref 80–100)
METHEMOGLOBIN VENOUS: 0.3 %
MONOCYTES ABSOLUTE: 0.4 K/UL (ref 0–1.3)
MONOCYTES RELATIVE PERCENT: 6.5 %
NEUTROPHILS ABSOLUTE: 2.6 K/UL (ref 1.7–7.7)
NEUTROPHILS RELATIVE PERCENT: 47.7 %
O2 SAT, VEN: 68 %
O2 THERAPY: ABNORMAL
PCO2, VEN: 63.1 MMHG (ref 40–50)
PDW BLD-RTO: 14.1 % (ref 12.4–15.4)
PH VENOUS: 7.26 (ref 7.35–7.45)
PLATELET # BLD: 265 K/UL (ref 135–450)
PMV BLD AUTO: 8.7 FL (ref 5–10.5)
PO2, VEN: 41.7 MMHG (ref 25–40)
POTASSIUM REFLEX MAGNESIUM: 4.9 MMOL/L (ref 3.5–5.1)
PRO-BNP: 329 PG/ML (ref 0–449)
RBC # BLD: 3.78 M/UL (ref 4–5.2)
SARS-COV-2 RNA, RT PCR: NOT DETECTED
SODIUM BLD-SCNC: 138 MMOL/L (ref 136–145)
TCO2 CALC VENOUS: 29 MMOL/L
TOTAL PROTEIN: 7.9 G/DL (ref 6.4–8.2)
TROPONIN: <0.01 NG/ML
TSH REFLEX: 26.17 UIU/ML (ref 0.27–4.2)
WBC # BLD: 5.4 K/UL (ref 4–11)

## 2022-03-26 PROCEDURE — 99285 EMERGENCY DEPT VISIT HI MDM: CPT

## 2022-03-26 PROCEDURE — 80053 COMPREHEN METABOLIC PANEL: CPT

## 2022-03-26 PROCEDURE — 82803 BLOOD GASES ANY COMBINATION: CPT

## 2022-03-26 PROCEDURE — 84439 ASSAY OF FREE THYROXINE: CPT

## 2022-03-26 PROCEDURE — 87636 SARSCOV2 & INF A&B AMP PRB: CPT

## 2022-03-26 PROCEDURE — G0378 HOSPITAL OBSERVATION PER HR: HCPCS

## 2022-03-26 PROCEDURE — 71045 X-RAY EXAM CHEST 1 VIEW: CPT

## 2022-03-26 PROCEDURE — 85025 COMPLETE CBC W/AUTO DIFF WBC: CPT

## 2022-03-26 PROCEDURE — 84484 ASSAY OF TROPONIN QUANT: CPT

## 2022-03-26 PROCEDURE — 1200000000 HC SEMI PRIVATE

## 2022-03-26 PROCEDURE — 93005 ELECTROCARDIOGRAM TRACING: CPT | Performed by: STUDENT IN AN ORGANIZED HEALTH CARE EDUCATION/TRAINING PROGRAM

## 2022-03-26 PROCEDURE — 70450 CT HEAD/BRAIN W/O DYE: CPT

## 2022-03-26 PROCEDURE — 83605 ASSAY OF LACTIC ACID: CPT

## 2022-03-26 PROCEDURE — 36415 COLL VENOUS BLD VENIPUNCTURE: CPT

## 2022-03-26 PROCEDURE — 87040 BLOOD CULTURE FOR BACTERIA: CPT

## 2022-03-26 PROCEDURE — 83690 ASSAY OF LIPASE: CPT

## 2022-03-26 PROCEDURE — 84443 ASSAY THYROID STIM HORMONE: CPT

## 2022-03-26 PROCEDURE — 83880 ASSAY OF NATRIURETIC PEPTIDE: CPT

## 2022-03-26 PROCEDURE — 82140 ASSAY OF AMMONIA: CPT

## 2022-03-26 RX ORDER — METHYLPREDNISOLONE SODIUM SUCCINATE 125 MG/2ML
125 INJECTION, POWDER, LYOPHILIZED, FOR SOLUTION INTRAMUSCULAR; INTRAVENOUS ONCE
Status: DISCONTINUED | OUTPATIENT
Start: 2022-03-26 | End: 2022-03-26

## 2022-03-26 RX ORDER — IPRATROPIUM BROMIDE AND ALBUTEROL SULFATE 2.5; .5 MG/3ML; MG/3ML
1 SOLUTION RESPIRATORY (INHALATION)
Status: DISCONTINUED | OUTPATIENT
Start: 2022-03-27 | End: 2022-03-27

## 2022-03-26 NOTE — ED PROVIDER NOTES
[address] ED      CHIEF COMPLAINT  Found unresponsive    HISTORY OF PRESENT ILLNESS  Malinda Blackman is a 80 y.o. female with a past medical history of hypertension, IBS, depression, vascular dementia, who presents to the ED complaining of altered mental status. History is limited due to patient's mental status. Patient was found unresponsive by family. They report that she has been napping more today but had been up and about earlier in the day. Family attempted to wake her without success so EMS was called. Between family members and EMS, patient was unable to be aroused for approximately 30 minutes. Patient was not hypoglycemic. She was hypoxic, she does not use oxygen at baseline. No reports of fever or cough. Patient denies any pain. Old records reviewed: No pertinent information noted. No other complaints, modifying factors or associated symptoms. I have reviewed the following from the nursing documentation. Past Medical History:   Diagnosis Date    Depression     Hyperlipidemia     Hypertension     Hypothyroidism     Osteoarthritis     Osteoarthritis      Past Surgical History:   Procedure Laterality Date    INTERTROCHANTERIC HIP FRACTURE SURGERY  3/13    Lt    JOINT REPLACEMENT      Rt hip    JOINT REPLACEMENT      Rt knee    SPINE SURGERY       History reviewed. No pertinent family history. Social History     Socioeconomic History    Marital status:       Spouse name: Not on file    Number of children: 3    Years of education: Not on file    Highest education level: Not on file   Occupational History    Not on file   Tobacco Use    Smoking status: Never Smoker    Smokeless tobacco: Never Used   Vaping Use    Vaping Use: Not on file   Substance and Sexual Activity    Alcohol use: No    Drug use: No    Sexual activity: Never   Other Topics Concern    Not on file   Social History Narrative    Not on file     Social Determinants of Health Financial Resource Strain: Low Risk     Difficulty of Paying Living Expenses: Not hard at all   Food Insecurity: No Food Insecurity    Worried About Running Out of Food in the Last Year: Never true    Donald of Food in the Last Year: Never true   Transportation Needs: No Transportation Needs    Lack of Transportation (Medical): No    Lack of Transportation (Non-Medical): No   Physical Activity: Inactive    Days of Exercise per Week: 0 days    Minutes of Exercise per Session: 0 min   Stress:     Feeling of Stress : Not on file   Social Connections:     Frequency of Communication with Friends and Family: Not on file    Frequency of Social Gatherings with Friends and Family: Not on file    Attends Temple Services: Not on file    Active Member of Clubs or Organizations: Not on file    Attends Club or Organization Meetings: Not on file    Marital Status: Not on file   Intimate Partner Violence:     Fear of Current or Ex-Partner: Not on file    Emotionally Abused: Not on file    Physically Abused: Not on file    Sexually Abused: Not on file   Housing Stability: [address] Unable to Pay for Housing in the Last Year: No    Number of [place] in the Last Year: 1    Unstable Housing in the Last Year: No     No current facility-administered medications for this encounter. Current Outpatient Medications   Medication Sig Dispense Refill    cephALEXin (KEFLEX) 500 MG capsule Take 1 capsule by mouth 2 times daily for 4 days 8 capsule 0    traZODone (DESYREL) 50 MG tablet TAKE 0.5-1 TABLETS BY MOUTH NIGHTLY AS NEEDED FOR SLEEP 30 tablet 1    diphenhydrAMINE (BENADRYL) 25 MG capsule Take 25 mg by mouth every 6 hours as needed for Itching or Allergies Keeps pt awake      oxyCODONE-acetaminophen (PERCOCET) 5-325 MG per tablet TAKE 1 TABLET BY MOUTH EVERY 8 HOURS AS NEEDED FOR PAIN FOR UP TO 30 DAYS.  - REDUCE DOSES TAKEN AS PAIN BECOMES MANAGEABLE 90 tablet 0    enalapril (VASOTEC) 5 MG tablet TAKE 1 TABLET DAILY 90 tablet 1    levothyroxine (SYNTHROID) 50 MCG tablet TAKE 1 TABLET DAILY (Patient taking differently: Take 75 mcg by mouth Daily Per pt's family) 30 tablet 11    miSOPROStol (CYTOTEC) 200 MCG tablet TAKE 1 TABLET DAILY 60 tablet 5    gabapentin (NEURONTIN) 300 MG capsule TAKE 1 CAPSULE AT BEDTIME (Patient taking differently: 300 mg. Pt still taking per family) 90 capsule 3     Allergies   Allergen Reactions    Aspirin Other (See Comments)     Gastric pain       REVIEW OF SYSTEMS  Unable to assess due to patient mental status    PHYSICAL EXAM  BP (!) 156/104   Pulse 90   Temp 97.9 °F (36.6 °C) (Oral)   Resp 27   Ht 5' 1\" (1.549 m)   Wt 90 lb (40.8 kg)   SpO2 85%   BMI 17.01 kg/m²    GENERAL APPEARANCE: Awake, patient is not answering questions initially. HENT: Normocephalic. Atraumatic. Mucous membranes are dry  NECK: Supple. Full range of motion of the neck without stiffness or pain. No meningmus  EYES: PERRL. EOM's grossly intact. HEART/CHEST: RRR. No murmurs. Chest wall is not tender to palpation. LUNGS: Respirations unlabored. Diminished breath sounds bilaterally. ABDOMEN: No tenderness. Soft. Non-distended. No masses. No organomegaly. No guarding or rebound. MUSCULOSKELETAL: No extremity edema. Compartments soft. No deformity. No tenderness in the extremities. All extremities neurovascularly intact. SKIN: Warm and dry. No acute rashes. NEUROLOGICAL: Awake but not oriented. No gross facial drooping. Strength 5/5, sensation intact. PSYCHIATRIC: Normal mood and affect. LABS  I have reviewed all labs for this visit.    Results for orders placed or performed during the hospital encounter of 03/26/22   COVID-19 & Influenza Combo    Specimen: Nasopharyngeal Swab   Result Value Ref Range    SARS-CoV-2 RNA, RT PCR NOT DETECTED NOT DETECTED    INFLUENZA A NOT DETECTED NOT DETECTED    INFLUENZA B NOT DETECTED NOT DETECTED   Soft CBC with Auto Differential   Result Value Ref Range    WBC 5.4 4.0 - 11.0 K/uL    RBC 3.78 (L) 4.00 - 5.20 M/uL    Hemoglobin 13.0 12.0 - 16.0 g/dL    Hematocrit 38.8 36.0 - 48.0 %    .6 (H) 80.0 - 100.0 fL    MCH 34.4 (H) 26.0 - 34.0 pg    MCHC 33.5 31.0 - 36.0 g/dL    RDW 14.1 12.4 - 15.4 %    Platelets 389 456 - 690 K/uL    MPV 8.7 5.0 - 10.5 fL    Neutrophils % 47.7 %    Lymphocytes % 40.0 %    Monocytes % 6.5 %    Eosinophils % 5.2 %    Basophils % 0.6 %    Neutrophils Absolute 2.6 1.7 - 7.7 K/uL    Lymphocytes Absolute 2.2 1.0 - 5.1 K/uL    Monocytes Absolute 0.4 0.0 - 1.3 K/uL    Eosinophils Absolute 0.3 0.0 - 0.6 K/uL    Basophils Absolute 0.0 0.0 - 0.2 K/uL   Comprehensive Metabolic Panel w/ Reflex to MG   Result Value Ref Range    Sodium 138 136 - 145 mmol/L    Potassium reflex Magnesium 4.9 3.5 - 5.1 mmol/L    Chloride 98 (L) 99 - 110 mmol/L    CO2 32 21 - 32 mmol/L    Anion Gap 8 3 - 16    Glucose 105 (H) 70 - 99 mg/dL    BUN 31 (H) 7 - 20 mg/dL    CREATININE 1.3 (H) 0.6 - 1.2 mg/dL    GFR Non-African American 39 (A) >60    GFR  47 (A) >60    Calcium 10.5 8.3 - 10.6 mg/dL    Total Protein 7.9 6.4 - 8.2 g/dL    Albumin 4.7 3.4 - 5.0 g/dL    Albumin/Globulin Ratio 1.5 1.1 - 2.2    Total Bilirubin 0.6 0.0 - 1.0 mg/dL    Alkaline Phosphatase 75 40 - 129 U/L    ALT 18 10 - 40 U/L    AST 32 15 - 37 U/L   Lipase   Result Value Ref Range    Lipase 20.0 13.0 - 60.0 U/L   Troponin   Result Value Ref Range    Troponin <0.01 <0.01 ng/mL   Brain Natriuretic Peptide   Result Value Ref Range    Pro- 0 - 449 pg/mL   Urinalysis with Microscopic   Result Value Ref Range    Color, UA Yellow Straw/Yellow    Clarity, UA SL CLOUDY (A) Clear    Glucose, Ur Negative Negative mg/dL    Bilirubin Urine Negative Negative    Ketones, Urine Negative Negative mg/dL    Specific Gravity, UA 1.020 1.005 - 1.030    Blood, Urine MODERATE (A) Negative    pH, UA 6.5 5.0 - 8.0    Protein, UA TRACE (A) Negative mg/dL    Urobilinogen, Urine 2.0 (A) <2.0 E.U./dL    Nitrite, Urine Negative Negative    Leukocyte Esterase, Urine SMALL (A) Negative    Microscopic Examination YES     Urine Type NotGiven     Mucus, UA 2+ (A) None Seen /LPF    WBC, UA 3-5 0 - 5 /HPF    RBC, UA 3-4 0 - 4 /HPF    Bacteria, UA 4+ (A) None Seen /HPF    Crystals, UA Few Triple Phos (A) None Seen /HPF   Blood Gas, Venous   Result Value Ref Range    pH, Shon 7.257 (L) 7.350 - 7.450    pCO2, Shon 63.1 (H) 40.0 - 50.0 mmHg    pO2, Shon 41.7 (H) 25.0 - 40.0 mmHg    HCO3, Venous 27.5 23.0 - 29.0 mmol/L    Base Excess, Shon -1.1 -3.0 - 3.0 mmol/L    O2 Sat, Shon 68 Not Established %    Carboxyhemoglobin 1.8 (H) 0.0 - 1.5 %    MetHgb, Shon 0.3 <1.5 %    TC02 (Calc), Shon 29 Not Established mmol/L    O2 Therapy Unknown    Lactate, Sepsis   Result Value Ref Range    Lactic Acid, Sepsis 1.0 0.4 - 1.9 mmol/L   TSH with Reflex   Result Value Ref Range    TSH 26.17 (H) 0.27 - 4.20 uIU/mL   Ammonia   Result Value Ref Range    Ammonia 18 11 - 51 umol/L   Blood gas, venous   Result Value Ref Range    pH, Shon 7.315 (L) 7.350 - 7.450    pCO2, Shon 58.7 (H) 40.0 - 50.0 mmHg    pO2, Shon 41.6 (H) 25.0 - 40.0 mmHg    HCO3, Venous 29.2 (H) 23.0 - 29.0 mmol/L    Base Excess, Shon 1.8 -3.0 - 3.0 mmol/L    O2 Sat, Shon 72 Not Established %    Carboxyhemoglobin 1.3 0.0 - 1.5 %    MetHgb, Shon 0.3 <1.5 %    TC02 (Calc), Shon 31 Not Established mmol/L    O2 Therapy Unknown        ECG  The Ekg interpreted by me shows  normal sinus rhythm with a rate of 86  Axis is   Normal  QTc is  within an acceptable range  Intervals and Durations are unremarkable. ST Segments: nonspecific changes  Change from prior EKG dated 5/15/19    RADIOLOGY    CT Head WO Contrast   Final Result      1. No evidence of acute intracranial process. 2.  Minimal hypodensity involving a right superior lateral parietal gyrus   suggesting chronic ischemic change unchanged from the prior study.       3.  Findings of presumed small vessel ischemic deep white matter disease. 4.  Prominence of the sulci and/or CSF spaces suggests a degree of cerebral   atrophy. XR CHEST PORTABLE   Final Result   1. No definite radiographic evidence of acute cardiopulmonary disease. 2. Indeterminate 18 mm nodular density central lower right lung. Noncontrast   chest CT recommended on a nonemergent basis. 3. Pulmonary sequela typical of that seen with smoking, including COPD;   correlate with clinical history. 4. Chronic changes of pulmonary fibrosis. 5. Calcific atherosclerotic disease aorta. RECOMMENDATION:   Prompt, nonemergent noncontrast chest CT follow-up                ED COURSE / MDM  Patient seen and evaluated. Old records reviewed and pertinent information included in HPI. Labs and imaging reviewed and results discussed with patient. Overall chronically ill appearing patient, presenting for AMS. Physical exam remarkable for chronic ill appearance, confusion, and hypoxia. Differential diagnosis includes but is not limited to: Hypoxemia, ischemic encephalopathy, hepatic encephalopathy, seizure or postictal state, hypoglycemia and hyperglycemia,  hypotension and hypoperfusion, electrolytes disturbances, Infectious processes such as pneumonia or urinary tract infection, Substance use or withdrawal, Medication adverse event or interaction, CVA, subdural hematoma, meningitis, encephalitis, thyroid disease, arrhythmia, MI or CHF, hyperthermia or hypothermia, Dehydration, sleep deprivation    EKG, laboratory studies, and imaging obtained. Workup showed:    No electrolyte abnormalities or evidence of worsening kidney dysfunction. Troponin within normal limits, EKG without evidence of acute ischemia. BNP within normal limits, no evidence of fluid overload on exam.  Liver function test within normal limits. TSH is elevated, T4 pending. No leukocytosis anemia, thrombocytopenia. Covid and flu swab negative.   Ammonia within normal limits, low suspicion for hepatic encephalopathy. Urinalysis shows evidence of possible infection. Patient started on antibiotics. Culture sent. Lipase within normal limits, low suspicion for pancreatitis. Blood gas shows acidemia and hypercarbia. Patient does not have a known history of COPD. Patient will receive breathing treatments and trending of VBG. Chest x-ray shows pneumonia, pneumothorax, pleural effusion, pulmonary edema. CT head shows no evidence of intracranial hemorrhage or other acute abnormality. At this time, do feel the patient requires admission for further work-up and management due to episode of unresponsiveness and new oxygen requirement. Discussed the patient with hospital team, [facility], patient to be admitted to [facility]. CLINICAL IMPRESSION  1. Altered mental status, unspecified altered mental status type    2. Urinary tract infection in female    3. Acute respiratory failure with hypoxia and hypercapnia (HCC)        Blood pressure (!) 140/78, pulse 83, temperature 97.9 °F (36.6 °C), temperature source Oral, resp. rate 18, height 5' 1\" (1.549 m), weight 90 lb (40.8 kg), SpO2 99 %, not currently breastfeeding. DISPOSITION  Marco Mahajan was admitted in stable condition. DISCLAIMER: This chart was created using Dragon dictation software. Efforts were made by me to ensure accuracy, however some errors may be present due to limitations of this technology and occasionally words are not transcribed correctly.           Cookie Rainey MD  03/29/22 [id number]

## 2022-03-27 LAB
ANION GAP SERPL CALCULATED.3IONS-SCNC: 12 MMOL/L (ref 3–16)
BACTERIA: ABNORMAL /HPF
BASE EXCESS VENOUS: 1.8 MMOL/L (ref -3–3)
BASOPHILS ABSOLUTE: 0 K/UL (ref 0–0.2)
BASOPHILS RELATIVE PERCENT: 0.6 %
BILIRUBIN URINE: NEGATIVE
BLOOD, URINE: ABNORMAL
BUN BLDV-MCNC: 27 MG/DL (ref 7–20)
CALCIUM SERPL-MCNC: 9.7 MG/DL (ref 8.3–10.6)
CARBOXYHEMOGLOBIN: 1.3 % (ref 0–1.5)
CHLORIDE BLD-SCNC: 99 MMOL/L (ref 99–110)
CLARITY: ABNORMAL
CO2: 25 MMOL/L (ref 21–32)
COLOR: YELLOW
CREAT SERPL-MCNC: 1.1 MG/DL (ref 0.6–1.2)
CRYSTALS, UA: ABNORMAL /HPF
EKG ATRIAL RATE: 86 BPM
EKG DIAGNOSIS: NORMAL
EKG P AXIS: 61 DEGREES
EKG P-R INTERVAL: 170 MS
EKG Q-T INTERVAL: 364 MS
EKG QRS DURATION: 74 MS
EKG QTC CALCULATION (BAZETT): 435 MS
EKG R AXIS: 59 DEGREES
EKG T AXIS: 91 DEGREES
EKG VENTRICULAR RATE: 86 BPM
EOSINOPHILS ABSOLUTE: 0.3 K/UL (ref 0–0.6)
EOSINOPHILS RELATIVE PERCENT: 3.9 %
GFR AFRICAN AMERICAN: 57
GFR NON-AFRICAN AMERICAN: 47
GLUCOSE BLD-MCNC: 88 MG/DL (ref 70–99)
GLUCOSE URINE: NEGATIVE MG/DL
HCO3 VENOUS: 29.2 MMOL/L (ref 23–29)
HCT VFR BLD CALC: 38.4 % (ref 36–48)
HEMOGLOBIN: 12.5 G/DL (ref 12–16)
KETONES, URINE: NEGATIVE MG/DL
LACTIC ACID, SEPSIS: 0.9 MMOL/L (ref 0.4–1.9)
LEUKOCYTE ESTERASE, URINE: ABNORMAL
LYMPHOCYTES ABSOLUTE: 1.9 K/UL (ref 1–5.1)
LYMPHOCYTES RELATIVE PERCENT: 24.3 %
MCH RBC QN AUTO: 34.4 PG (ref 26–34)
MCHC RBC AUTO-ENTMCNC: 32.5 G/DL (ref 31–36)
MCV RBC AUTO: 106 FL (ref 80–100)
METHEMOGLOBIN VENOUS: 0.3 %
MICROSCOPIC EXAMINATION: YES
MONOCYTES ABSOLUTE: 0.4 K/UL (ref 0–1.3)
MONOCYTES RELATIVE PERCENT: 5.1 %
MUCUS: ABNORMAL /LPF
NEUTROPHILS ABSOLUTE: 5.2 K/UL (ref 1.7–7.7)
NEUTROPHILS RELATIVE PERCENT: 66.1 %
NITRITE, URINE: NEGATIVE
O2 SAT, VEN: 72 %
O2 THERAPY: ABNORMAL
PCO2, VEN: 58.7 MMHG (ref 40–50)
PDW BLD-RTO: 14.3 % (ref 12.4–15.4)
PH UA: 6.5 (ref 5–8)
PH VENOUS: 7.32 (ref 7.35–7.45)
PLATELET # BLD: 234 K/UL (ref 135–450)
PMV BLD AUTO: 8.6 FL (ref 5–10.5)
PO2, VEN: 41.6 MMHG (ref 25–40)
POTASSIUM REFLEX MAGNESIUM: 5.1 MMOL/L (ref 3.5–5.1)
PROTEIN UA: ABNORMAL MG/DL
RBC # BLD: 3.62 M/UL (ref 4–5.2)
RBC UA: ABNORMAL /HPF (ref 0–4)
SODIUM BLD-SCNC: 136 MMOL/L (ref 136–145)
SPECIFIC GRAVITY UA: 1.02 (ref 1–1.03)
T4 FREE: 1 NG/DL (ref 0.9–1.8)
TCO2 CALC VENOUS: 31 MMOL/L
URINE TYPE: ABNORMAL
UROBILINOGEN, URINE: 2 E.U./DL
WBC # BLD: 7.9 K/UL (ref 4–11)
WBC UA: ABNORMAL /HPF (ref 0–5)

## 2022-03-27 PROCEDURE — G0378 HOSPITAL OBSERVATION PER HR: HCPCS

## 2022-03-27 PROCEDURE — 81001 URINALYSIS AUTO W/SCOPE: CPT

## 2022-03-27 PROCEDURE — 93010 ELECTROCARDIOGRAM REPORT: CPT | Performed by: INTERNAL MEDICINE

## 2022-03-27 PROCEDURE — 6360000002 HC RX W HCPCS: Performed by: INTERNAL MEDICINE

## 2022-03-27 PROCEDURE — 96361 HYDRATE IV INFUSION ADD-ON: CPT

## 2022-03-27 PROCEDURE — 87086 URINE CULTURE/COLONY COUNT: CPT

## 2022-03-27 PROCEDURE — 94761 N-INVAS EAR/PLS OXIMETRY MLT: CPT

## 2022-03-27 PROCEDURE — 80048 BASIC METABOLIC PNL TOTAL CA: CPT

## 2022-03-27 PROCEDURE — 6370000000 HC RX 637 (ALT 250 FOR IP): Performed by: INTERNAL MEDICINE

## 2022-03-27 PROCEDURE — 2580000003 HC RX 258: Performed by: INTERNAL MEDICINE

## 2022-03-27 PROCEDURE — 99222 1ST HOSP IP/OBS MODERATE 55: CPT | Performed by: NURSE PRACTITIONER

## 2022-03-27 PROCEDURE — 96372 THER/PROPH/DIAG INJ SC/IM: CPT

## 2022-03-27 PROCEDURE — 87077 CULTURE AEROBIC IDENTIFY: CPT

## 2022-03-27 PROCEDURE — 1200000000 HC SEMI PRIVATE

## 2022-03-27 PROCEDURE — 2580000003 HC RX 258: Performed by: STUDENT IN AN ORGANIZED HEALTH CARE EDUCATION/TRAINING PROGRAM

## 2022-03-27 PROCEDURE — 83605 ASSAY OF LACTIC ACID: CPT

## 2022-03-27 PROCEDURE — 2700000000 HC OXYGEN THERAPY PER DAY

## 2022-03-27 PROCEDURE — 36415 COLL VENOUS BLD VENIPUNCTURE: CPT

## 2022-03-27 PROCEDURE — 85025 COMPLETE CBC W/AUTO DIFF WBC: CPT

## 2022-03-27 PROCEDURE — 82803 BLOOD GASES ANY COMBINATION: CPT

## 2022-03-27 PROCEDURE — 87186 SC STD MICRODIL/AGAR DIL: CPT

## 2022-03-27 RX ORDER — SODIUM CHLORIDE 0.9 % (FLUSH) 0.9 %
5-40 SYRINGE (ML) INJECTION EVERY 12 HOURS SCHEDULED
Status: DISCONTINUED | OUTPATIENT
Start: 2022-03-27 | End: 2022-03-28 | Stop reason: HOSPADM

## 2022-03-27 RX ORDER — SODIUM CHLORIDE 0.9 % (FLUSH) 0.9 %
5-40 SYRINGE (ML) INJECTION PRN
Status: DISCONTINUED | OUTPATIENT
Start: 2022-03-27 | End: 2022-03-28 | Stop reason: HOSPADM

## 2022-03-27 RX ORDER — ACETAMINOPHEN 650 MG/1
650 SUPPOSITORY RECTAL EVERY 6 HOURS PRN
Status: DISCONTINUED | OUTPATIENT
Start: 2022-03-27 | End: 2022-03-28 | Stop reason: HOSPADM

## 2022-03-27 RX ORDER — SODIUM CHLORIDE 9 MG/ML
25 INJECTION, SOLUTION INTRAVENOUS PRN
Status: DISCONTINUED | OUTPATIENT
Start: 2022-03-27 | End: 2022-03-28 | Stop reason: HOSPADM

## 2022-03-27 RX ORDER — SODIUM CHLORIDE 9 MG/ML
1000 INJECTION, SOLUTION INTRAVENOUS CONTINUOUS
Status: DISCONTINUED | OUTPATIENT
Start: 2022-03-27 | End: 2022-03-28 | Stop reason: HOSPADM

## 2022-03-27 RX ORDER — ONDANSETRON 2 MG/ML
4 INJECTION INTRAMUSCULAR; INTRAVENOUS EVERY 6 HOURS PRN
Status: DISCONTINUED | OUTPATIENT
Start: 2022-03-27 | End: 2022-03-28 | Stop reason: HOSPADM

## 2022-03-27 RX ORDER — IPRATROPIUM BROMIDE AND ALBUTEROL SULFATE 2.5; .5 MG/3ML; MG/3ML
1 SOLUTION RESPIRATORY (INHALATION) 2 TIMES DAILY
Status: DISCONTINUED | OUTPATIENT
Start: 2022-03-27 | End: 2022-03-27

## 2022-03-27 RX ORDER — ACETAMINOPHEN 325 MG/1
650 TABLET ORAL EVERY 6 HOURS PRN
Status: DISCONTINUED | OUTPATIENT
Start: 2022-03-27 | End: 2022-03-28 | Stop reason: HOSPADM

## 2022-03-27 RX ORDER — MISOPROSTOL 200 UG/1
200 TABLET ORAL DAILY
Status: DISCONTINUED | OUTPATIENT
Start: 2022-03-27 | End: 2022-03-28 | Stop reason: HOSPADM

## 2022-03-27 RX ORDER — LEVOTHYROXINE SODIUM 0.03 MG/1
75 TABLET ORAL DAILY
Status: DISCONTINUED | OUTPATIENT
Start: 2022-03-27 | End: 2022-03-28 | Stop reason: HOSPADM

## 2022-03-27 RX ORDER — ONDANSETRON 4 MG/1
4 TABLET, ORALLY DISINTEGRATING ORAL EVERY 8 HOURS PRN
Status: DISCONTINUED | OUTPATIENT
Start: 2022-03-27 | End: 2022-03-28 | Stop reason: HOSPADM

## 2022-03-27 RX ORDER — IPRATROPIUM BROMIDE AND ALBUTEROL SULFATE 2.5; .5 MG/3ML; MG/3ML
1 SOLUTION RESPIRATORY (INHALATION) EVERY 4 HOURS PRN
Status: DISCONTINUED | OUTPATIENT
Start: 2022-03-27 | End: 2022-03-28 | Stop reason: HOSPADM

## 2022-03-27 RX ORDER — DIPHENHYDRAMINE HCL 25 MG
25 CAPSULE ORAL EVERY 6 HOURS PRN
COMMUNITY

## 2022-03-27 RX ORDER — POLYETHYLENE GLYCOL 3350 17 G/17G
17 POWDER, FOR SOLUTION ORAL DAILY PRN
Status: DISCONTINUED | OUTPATIENT
Start: 2022-03-27 | End: 2022-03-28 | Stop reason: HOSPADM

## 2022-03-27 RX ADMIN — MISOPROSTOL 200 MCG: 200 TABLET ORAL at 10:33

## 2022-03-27 RX ADMIN — SODIUM CHLORIDE 1000 ML: 9 INJECTION, SOLUTION INTRAVENOUS at 00:29

## 2022-03-27 RX ADMIN — ENOXAPARIN SODIUM 30 MG: 100 INJECTION SUBCUTANEOUS at 10:33

## 2022-03-27 RX ADMIN — Medication 10 ML: at 10:33

## 2022-03-27 RX ADMIN — LEVOTHYROXINE SODIUM 75 MCG: 25 TABLET ORAL at 10:32

## 2022-03-27 RX ADMIN — SODIUM CHLORIDE 1000 ML: 9 INJECTION, SOLUTION INTRAVENOUS at 14:00

## 2022-03-27 NOTE — ED NOTES
Diet order changed at this time.  Pt had trouble eating lunch due to not having teeth in.      Betina Krishnan RN  03/27/22 0524

## 2022-03-27 NOTE — PROGRESS NOTES
RT Inhaler-Nebulizer Bronchodilator Protocol Note    There is a bronchodilator order in the chart from a provider indicating to follow the RT Bronchodilator Protocol and there is an Initiate RT Inhaler-Nebulizer Bronchodilator Protocol order as well (see protocol at bottom of note). CXR Findings:  XR CHEST PORTABLE    Result Date: 3/26/2022  1. No definite radiographic evidence of acute cardiopulmonary disease. 2. Indeterminate 18 mm nodular density central lower right lung. Noncontrast chest CT recommended on a nonemergent basis. 3. Pulmonary sequela typical of that seen with smoking, including COPD; correlate with clinical history. 4. Chronic changes of pulmonary fibrosis. 5. Calcific atherosclerotic disease aorta. RECOMMENDATION: Prompt, nonemergent noncontrast chest CT follow-up       The findings from the last RT Protocol Assessment were as follows:   History Pulmonary Disease: None or smoker <15 pack years  Respiratory Pattern: Regular pattern and RR 12-20 bpm  Breath Sounds: Slightly diminished and/or crackles  Cough: Strong, spontaneous, non-productive  Indication for Bronchodilator Therapy: Decreased or absent breath sounds  Bronchodilator Assessment Score: 2    Aerosolized bronchodilator medication orders have been revised according to the RT Inhaler-Nebulizer Bronchodilator Protocol below. Respiratory Therapist to perform RT Therapy Protocol Assessment initially then follow the protocol. Repeat RT Therapy Protocol Assessment PRN for score 0-3 or on second treatment, BID, and PRN for scores above 3. No Indications  adjust the frequency to every 6 hours PRN wheezing or bronchospasm, if no treatments needed after 48 hours then discontinue using Per Protocol order mode. If indication present, adjust the RT bronchodilator orders based on the Bronchodilator Assessment Score as indicated below.   Use Inhaler orders unless patient has one or more of the following: on home nebulizer, not able to hold breath for 10 seconds, is not alert and oriented, cannot activate and use MDI correctly, or respiratory rate 25 breaths per minute or more, then use the equivalent nebulizer order(s) with same Frequency and PRN reasons based on the score. If a patient is on this medication at home then do not decrease Frequency below that used at home. 0-3  enter or revise RT bronchodilator order(s) to equivalent RT Bronchodilator order with Frequency of every 4 hours PRN for wheezing or increased work of breathing using Per Protocol order mode. 4-6  enter or revise RT Bronchodilator order(s) to two equivalent RT bronchodilator orders with one order with BID Frequency and one order with Frequency of every 4 hours PRN wheezing or increased work of breathing using Per Protocol order mode. 7-10  enter or revise RT Bronchodilator order(s) to two equivalent RT bronchodilator orders with one order with TID Frequency and one order with Frequency of every 4 hours PRN wheezing or increased work of breathing using Per Protocol order mode. 11-13  enter or revise RT Bronchodilator order(s) to one equivalent RT bronchodilator order with QID Frequency and an Albuterol order with Frequency of every 4 hours PRN wheezing or increased work of breathing using Per Protocol order mode. Greater than 13  enter or revise RT Bronchodilator order(s) to one equivalent RT bronchodilator order with every 4 hours Frequency and an Albuterol order with Frequency of every 2 hours PRN wheezing or increased work of breathing using Per Protocol order mode.      .    Electronically signed by Olga López RCP on 3/27/2022 at 7:59 AM

## 2022-03-27 NOTE — H&P
Hospital Medicine History & Physical      PCP: Corinne 173, DO    Date of Admission: 3/26/2022    Date of Service: Pt seen/examined on 3/26/2022    Chief Complaint: Unresponsive    History Of Present Illness:    80 y.o. female demented female who was brought to the emergency department by EMS for unresponsiveness. The patient lives at home with family. I have tried to contact family without success. But per ER staff she was found down and unresponsive. The family attempted to try to wake her up without success so to call EMS. Per reports he took EMS a couple of minutes before the patient was able to be aroused. Vital signs were stable and her blood sugars were within normal limits. In emergency department she was awake and alert, apparently she was hypoxic on room air and was started on supplemental oxygen otherwise she was stable. She is demented and cannot provide much history. She will be admitted for observation. Past Medical History:          Diagnosis Date    Depression     Hyperlipidemia     Hypertension     Hypothyroidism     Osteoarthritis        Past Surgical History:          Procedure Laterality Date    INTERTROCHANTERIC HIP FRACTURE SURGERY  3/13    Lt    JOINT REPLACEMENT      Rt hip    JOINT REPLACEMENT      Rt knee       Medications Prior to Admission:      Prior to Admission medications    Medication Sig Start Date End Date Taking? Authorizing Provider   oxyCODONE-acetaminophen (PERCOCET) 5-325 MG per tablet TAKE 1 TABLET BY MOUTH EVERY 8 HOURS AS NEEDED FOR PAIN FOR UP TO 30 DAYS.  - REDUCE DOSES TAKEN AS PAIN BECOMES MANAGEABLE 2/28/22 3/30/22  Brandan Bradshaw DO   traZODone (DESYREL) 50 MG tablet TAKE 0.5-1 TABLETS BY MOUTH NIGHTLY AS NEEDED FOR SLEEP 2/28/22   RADAMES Rizo - CNP   enalapril (VASOTEC) 5 MG tablet TAKE 1 TABLET DAILY 12/7/21   Nargis Mcguire [place], PA   levothyroxine (SYNTHROID) 50 MCG tablet TAKE 1 TABLET DAILY  Patient taking differently: Take 75 mcg by mouth Daily Per pt's family 12/7/21   Shahnaz Blackman,    miSOPROStol (CYTOTEC) 200 MCG tablet TAKE 1 TABLET DAILY 9/8/21   Vish Mishra DO   gabapentin (NEURONTIN) 300 MG capsule TAKE 1 CAPSULE AT BEDTIME  Patient taking differently: 300 mg. Pt still taking per family 3/22/21 10/26/21  Shahnaz CalhounDO       Allergies:  Aspirin    Social History:      TOBACCO:   reports that she has never smoked. She has never used smokeless tobacco.  ETOH:   reports no history of alcohol use. Family History:      History reviewed. No pertinent family history. REVIEW OF SYSTEMS:   Cannot obtain given patient's mental status    PHYSICAL EXAM:  BP (!) 156/104   Pulse 76   Resp 20   Ht 5' 1\" (1.549 m)   Wt 90 lb (40.8 kg)   SpO2 99%   BMI 17.01 kg/m²   General appearance:  No apparent distress, appears stated age and cooperative. HEENT:  Normal cephalic, atraumatic without obvious deformity. Pupils equal, round, and reactive to light. Extra ocular muscles intact. Conjunctivae/corneas clear. Neck: Supple, with full range of motion. No jugular venous distention. Trachea midline. Respiratory:  Normal respiratory effort. Clear to auscultation, bilaterally without Rales/Wheezes/Rhonchi. Cardiovascular:  Regular rate and rhythm with normal S1/S2 without murmurs, rubs or gallops. Abdomen: Soft, non-tender, non-distended with normal bowel sounds. Musculoskeletal:  No clubbing, cyanosis or edema bilaterally. Full range of motion without deformity. Skin: Skin color, texture, turgor normal.  No rashes or lesions. Neurologic:  Neurovascularly intact without any focal sensory/motor deficits.  Cranial nerves: II-XII intact, grossly non-focal.  Psychiatric:  Alert and oriented, thought content appropriate, normal insight  Capillary Refill: Brisk,< 3 seconds   Peripheral Pulses: +2 palpable, equal bilaterally       Labs:   Recent Labs     03/26/22  1940   WBC 5.4   HGB 13.0   HCT 38.8        Recent Labs 03/26/22 1940      K 4.9   CL 98*   CO2 32   BUN 31*   CREATININE 1.3*   CALCIUM 10.5     Recent Labs     03/26/22 1940   AST 32   ALT 18   BILITOT 0.6   ALKPHOS 75     No results for input(s): INR in the last 72 hours. Recent Labs     03/26/22 1940   TROPONINI <0.01       Urinalysis:   Lab Results   Component Value Date    NITRU Negative 05/15/2019    WBCUA 3-5 05/15/2019    BACTERIA 2+ 05/15/2019    RBCUA 3-5 05/15/2019    BLOODU SMALL 05/15/2019    SPECGRAV 1.025 05/15/2019    GLUCOSEU Negative 05/15/2019       Radiology:   CT Head WO Contrast   Final Result      1. No evidence of acute intracranial process. 2.  Minimal hypodensity involving a right superior lateral parietal gyrus   suggesting chronic ischemic change unchanged from the prior study. 3.  Findings of presumed small vessel ischemic deep white matter disease. 4.  Prominence of the sulci and/or CSF spaces suggests a degree of cerebral   atrophy. XR CHEST PORTABLE   Final Result   1. No definite radiographic evidence of acute cardiopulmonary disease. 2. Indeterminate 18 mm nodular density central lower right lung. Noncontrast   chest CT recommended on a nonemergent basis. 3. Pulmonary sequela typical of that seen with smoking, including COPD;   correlate with clinical history. 4. Chronic changes of pulmonary fibrosis. 5. Calcific atherosclerotic disease aorta. RECOMMENDATION:   Prompt, nonemergent noncontrast chest CT follow-up             ASSESSMENT:  Unresponsiveness, was awake and alert in the emergency department  Hypoxia  Dementia  Hypertension  Hypothyroidism  Osteoarthritis  Underweight    PLAN:  Supplemental oxygen therapy, home O2 evaluation prior to discharge. Patient currently lives with family. We will try to contact family and start discharge planning. For now she is requiring 2 L of oxygen to keep her saturations greater than 90.   Reevaluate in a.m.  -Troponins negative  -Resume home meds, elevated TSH.  -PT OT evaluation        DVT Prophylaxis: Lovenox  Diet: No diet orders on file  Code Status: Prior    Dispo -full code      Thank you for the opportunity to be involved in this patient's care.       (Please note that portions of this note were completed with a voice recognition program. Efforts were made to edit the dictations but occasionally words are mis-transcribed.)

## 2022-03-27 NOTE — ED NOTES
1534 - called PCU Case Management for Palliative care consult. Completed consult.       Millie Killian  03/27/22 1538

## 2022-03-27 NOTE — PROGRESS NOTES
RT Inhaler-Nebulizer Bronchodilator Protocol Note    There is a bronchodilator order in the chart from a provider indicating to follow the RT Bronchodilator Protocol and there is an Initiate RT Inhaler-Nebulizer Bronchodilator Protocol order as well (see protocol at bottom of note). CXR Findings:  XR CHEST PORTABLE    Result Date: 3/26/2022  1. No definite radiographic evidence of acute cardiopulmonary disease. 2. Indeterminate 18 mm nodular density central lower right lung. Noncontrast chest CT recommended on a nonemergent basis. 3. Pulmonary sequela typical of that seen with smoking, including COPD; correlate with clinical history. 4. Chronic changes of pulmonary fibrosis. 5. Calcific atherosclerotic disease aorta. RECOMMENDATION: Prompt, nonemergent noncontrast chest CT follow-up       The findings from the last RT Protocol Assessment were as follows:   History Pulmonary Disease: Chronic pulmonary disease  Respiratory Pattern: Regular pattern and RR 12-20 bpm  Breath Sounds: Slightly diminished and/or crackles  Cough: Strong, spontaneous, non-productive  Indication for Bronchodilator Therapy: Decreased or absent breath sounds  Bronchodilator Assessment Score: 4    Aerosolized bronchodilator medication orders have been revised according to the RT Inhaler-Nebulizer Bronchodilator Protocol below. Respiratory Therapist to perform RT Therapy Protocol Assessment initially then follow the protocol. Repeat RT Therapy Protocol Assessment PRN for score 0-3 or on second treatment, BID, and PRN for scores above 3. No Indications  adjust the frequency to every 6 hours PRN wheezing or bronchospasm, if no treatments needed after 48 hours then discontinue using Per Protocol order mode. If indication present, adjust the RT bronchodilator orders based on the Bronchodilator Assessment Score as indicated below.   Use Inhaler orders unless patient has one or more of the following: on home nebulizer, not able to hold breath for 10 seconds, is not alert and oriented, cannot activate and use MDI correctly, or respiratory rate 25 breaths per minute or more, then use the equivalent nebulizer order(s) with same Frequency and PRN reasons based on the score. If a patient is on this medication at home then do not decrease Frequency below that used at home. 0-3  enter or revise RT bronchodilator order(s) to equivalent RT Bronchodilator order with Frequency of every 4 hours PRN for wheezing or increased work of breathing using Per Protocol order mode. 4-6  enter or revise RT Bronchodilator order(s) to two equivalent RT bronchodilator orders with one order with BID Frequency and one order with Frequency of every 4 hours PRN wheezing or increased work of breathing using Per Protocol order mode. 7-10  enter or revise RT Bronchodilator order(s) to two equivalent RT bronchodilator orders with one order with TID Frequency and one order with Frequency of every 4 hours PRN wheezing or increased work of breathing using Per Protocol order mode. 11-13  enter or revise RT Bronchodilator order(s) to one equivalent RT bronchodilator order with QID Frequency and an Albuterol order with Frequency of every 4 hours PRN wheezing or increased work of breathing using Per Protocol order mode. Greater than 13  enter or revise RT Bronchodilator order(s) to one equivalent RT bronchodilator order with every 4 hours Frequency and an Albuterol order with Frequency of every 2 hours PRN wheezing or increased work of breathing using Per Protocol order mode.          Electronically signed by Phu Rucker RCP on 3/27/2022 at 3:26 AM

## 2022-03-27 NOTE — PROGRESS NOTES
Rn completed patient admission questions over the phone with Shital Cota and sheri's wife, who in which lives with the patient for 3/4 weeks a month. Med list verified with family. family would like to be notified when the patient is moved. Family reports a healed sacral wound and a wound on her back.

## 2022-03-27 NOTE — PROGRESS NOTES
Progress Note    Admit Date:  3/26/2022       Subjective:  Ms. Donnie Stock is resting in bed. Pt is confused to situation. Asking for something to drink. I did speak with son. Per son, he tried to wake patient up and wouldn't wake up at home. So they called 911. When EMS got her on a stretcher she did wake up. Son stated that she has had decrease appetite lately at home. Family stated that she would go days without sleeping and now have monitors at home. Son who is medical POA stated their goal is to keep her comfortable, and have been planning to meet with someone regarding palliative care. Order placed today. Objective:   Vitals:    03/27/22 0530   BP: 133/74   Pulse: 75   Resp: 15   SpO2: 100%       No intake or output data in the 24 hours ending 03/27/22 1036    Physical Exam:  Gen: No distress. Alert. Appears frail and chronically ill  Eyes: PERRL. No sclera icterus. No conjunctival injection. ENT: No discharge. Pharynx clear. Neck: No JVD. Trachea midline. Resp: No accessory muscle use. No crackles. No wheezes. No rhonchi. CV: Regular rate. Regular rhythm. No murmur. No rub. No edema. GI: Non-tender. Non-distended. Normal bowel sounds. Skin: Warm and dry. No nodule on exposed extremities. No rash on exposed extremities. M/S: No cyanosis. No joint deformity. No clubbing. Neuro: Awake. Grossly nonfocal    Psych: Oriented x 1. No anxiety or agitation.        Scheduled Meds:   levothyroxine  75 mcg Oral Daily    miSOPROStol  200 mcg Oral Daily    sodium chloride flush  5-40 mL IntraVENous 2 times per day    enoxaparin  30 mg SubCUTAneous Daily       Continuous Infusions:   sodium chloride      sodium chloride 1,000 mL (03/27/22 0029)       PRN Meds:  sodium chloride flush, sodium chloride, ondansetron **OR** ondansetron, polyethylene glycol, acetaminophen **OR** acetaminophen, ipratropium-albuterol, ipratropium-albuterol      Data:  CBC:   Recent Labs     03/26/22  1940 03/27/22  4574 WBC 5.4 7.9   HGB 13.0 12.5   HCT 38.8 38.4   .6* 106.0*    234     BMP:   Recent Labs     03/26/22 1940 03/27/22  0754    136   K 4.9 5.1   CL 98* 99   CO2 32 25   BUN 31* 27*   CREATININE 1.3* 1.1     LIVER PROFILE:   Recent Labs     03/26/22 1940   AST 32   ALT 18   LIPASE 20.0   BILITOT 0.6   ALKPHOS 75     PT/INR: No results for input(s): PROTIME, INR in the last 72 hours. CULTURES  Results for Carmentey Brain" (MRN [id number]) as of 3/27/2022 10:37   Ref. Range 3/26/2022 19:40   SARS-CoV-2 RNA, RT PCR Latest Ref Range: NOT DETECTED  NOT DETECTED   Results for Carmentey Brain" (MRN [id number]) as of 3/27/2022 10:37   Ref. Range 3/26/2022 19:40   INFLUENZA B Latest Ref Range: NOT DETECTED  NOT DETECTED   Results for Bettey Brain" (MRN [id number]) as of 3/27/2022 10:37   Ref. Range 3/26/2022 19:40   INFLUENZA A Latest Ref Range: NOT DETECTED  NOT DETECTED        RADIOLOGY  CT Head WO Contrast   Final Result      1. No evidence of acute intracranial process. 2.  Minimal hypodensity involving a right superior lateral parietal gyrus   suggesting chronic ischemic change unchanged from the prior study. 3.  Findings of presumed small vessel ischemic deep white matter disease. 4.  Prominence of the sulci and/or CSF spaces suggests a degree of cerebral   atrophy. XR CHEST PORTABLE   Final Result   1. No definite radiographic evidence of acute cardiopulmonary disease. 2. Indeterminate 18 mm nodular density central lower right lung. Noncontrast   chest CT recommended on a nonemergent basis. 3. Pulmonary sequela typical of that seen with smoking, including COPD;   correlate with clinical history. 4. Chronic changes of pulmonary fibrosis. 5. Calcific atherosclerotic disease aorta.       RECOMMENDATION:   Prompt, nonemergent noncontrast chest CT follow-up             Assessment/Plan:  Unresponsiveness, was awake and alert in the emergency department  - pt is awake and alert at this time  - discussed with family, pt stated that she recently has not been eating or drinking well. She does have ensure with each meal. Also stated that she sometimes doesn't sleep for days, but had been sleeping well prior to this episode. - troponin <0.01 on admission  - CT of head completed and showed no acute intracranial process    Hypoxia  Pulmonary nodule noted on CT  - on 2 liters   - continuous pulse ox  - wean as tolerated   - pulmonary nodule noted on CT, nonemergent CT chest recommended. Consider obtaining this while inpatient if hypoxia continues    Elevated creatinine  - 1.3 on admission  - likely 2/2 poor po intake  - continue IVF at 75 ml/hr     Generalized weakness   - PT/OT consulted    Dementia  Failure to Thrive  - appears this is getting worse  - patient has not been eating or drinking  - palliative consult per family request, goal is to keep patient comfortable     Hypertension  - holding enalapril at this time  - monitor    Hypothyroidism  - TSH elevated, currently on 75 mcg  - need to verify with family if patient has been compliant with medication  - if compliant with medication, consider increasing     Underweight  - will consult dietician   - family stated she drinks ensure with every meal    Osteoarthritis    Underweight  - will consult dietician   - family stated she drinks ensure with every meal    DVT Prophylaxis: Lovenox   Diet: ADULT DIET; Regular  Code Status: Limited- discussed with son Tonia Haji who is medical POA- DNR-CCA no intubation.        Mihaela Baptiste APRN - CNP

## 2022-03-28 ENCOUNTER — TELEPHONE (OUTPATIENT)
Dept: FAMILY MEDICINE CLINIC | Age: 87
End: 2022-03-28

## 2022-03-28 VITALS
WEIGHT: 90 LBS | OXYGEN SATURATION: 99 % | SYSTOLIC BLOOD PRESSURE: 140 MMHG | RESPIRATION RATE: 18 BRPM | BODY MASS INDEX: 16.99 KG/M2 | HEIGHT: 61 IN | DIASTOLIC BLOOD PRESSURE: 78 MMHG | TEMPERATURE: 97.9 F | HEART RATE: 83 BPM

## 2022-03-28 DIAGNOSIS — G89.4 CHRONIC PAIN SYNDROME: ICD-10-CM

## 2022-03-28 LAB
ANION GAP SERPL CALCULATED.3IONS-SCNC: 16 MMOL/L (ref 3–16)
BASOPHILS ABSOLUTE: 0.1 K/UL (ref 0–0.2)
BASOPHILS RELATIVE PERCENT: 0.5 %
BUN BLDV-MCNC: 18 MG/DL (ref 7–20)
CALCIUM SERPL-MCNC: 9.2 MG/DL (ref 8.3–10.6)
CHLORIDE BLD-SCNC: 102 MMOL/L (ref 99–110)
CO2: 20 MMOL/L (ref 21–32)
CREAT SERPL-MCNC: 1 MG/DL (ref 0.6–1.2)
EOSINOPHILS ABSOLUTE: 0 K/UL (ref 0–0.6)
EOSINOPHILS RELATIVE PERCENT: 0.4 %
GFR AFRICAN AMERICAN: >60
GFR NON-AFRICAN AMERICAN: 53
GLUCOSE BLD-MCNC: 153 MG/DL (ref 70–99)
HCT VFR BLD CALC: 38.6 % (ref 36–48)
HEMOGLOBIN: 13 G/DL (ref 12–16)
LYMPHOCYTES ABSOLUTE: 2.4 K/UL (ref 1–5.1)
LYMPHOCYTES RELATIVE PERCENT: 22 %
MCH RBC QN AUTO: 34.4 PG (ref 26–34)
MCHC RBC AUTO-ENTMCNC: 33.6 G/DL (ref 31–36)
MCV RBC AUTO: 102.4 FL (ref 80–100)
MONOCYTES ABSOLUTE: 0.5 K/UL (ref 0–1.3)
MONOCYTES RELATIVE PERCENT: 4.1 %
NEUTROPHILS ABSOLUTE: 8 K/UL (ref 1.7–7.7)
NEUTROPHILS RELATIVE PERCENT: 73 %
PDW BLD-RTO: 13.8 % (ref 12.4–15.4)
PLATELET # BLD: 299 K/UL (ref 135–450)
PMV BLD AUTO: 8.6 FL (ref 5–10.5)
POTASSIUM REFLEX MAGNESIUM: 4.2 MMOL/L (ref 3.5–5.1)
RBC # BLD: 3.77 M/UL (ref 4–5.2)
SODIUM BLD-SCNC: 138 MMOL/L (ref 136–145)
WBC # BLD: 10.9 K/UL (ref 4–11)

## 2022-03-28 PROCEDURE — G0378 HOSPITAL OBSERVATION PER HR: HCPCS

## 2022-03-28 PROCEDURE — 2580000003 HC RX 258: Performed by: STUDENT IN AN ORGANIZED HEALTH CARE EDUCATION/TRAINING PROGRAM

## 2022-03-28 PROCEDURE — 6360000002 HC RX W HCPCS: Performed by: NURSE PRACTITIONER

## 2022-03-28 PROCEDURE — 96366 THER/PROPH/DIAG IV INF ADDON: CPT

## 2022-03-28 PROCEDURE — 80048 BASIC METABOLIC PNL TOTAL CA: CPT

## 2022-03-28 PROCEDURE — 2580000003 HC RX 258: Performed by: NURSE PRACTITIONER

## 2022-03-28 PROCEDURE — 97165 OT EVAL LOW COMPLEX 30 MIN: CPT

## 2022-03-28 PROCEDURE — 97110 THERAPEUTIC EXERCISES: CPT

## 2022-03-28 PROCEDURE — 85025 COMPLETE CBC W/AUTO DIFF WBC: CPT

## 2022-03-28 PROCEDURE — 96372 THER/PROPH/DIAG INJ SC/IM: CPT

## 2022-03-28 PROCEDURE — 97535 SELF CARE MNGMENT TRAINING: CPT

## 2022-03-28 PROCEDURE — 96365 THER/PROPH/DIAG IV INF INIT: CPT

## 2022-03-28 PROCEDURE — 36415 COLL VENOUS BLD VENIPUNCTURE: CPT

## 2022-03-28 PROCEDURE — 97530 THERAPEUTIC ACTIVITIES: CPT

## 2022-03-28 PROCEDURE — 97161 PT EVAL LOW COMPLEX 20 MIN: CPT

## 2022-03-28 PROCEDURE — 6360000002 HC RX W HCPCS: Performed by: INTERNAL MEDICINE

## 2022-03-28 PROCEDURE — 6370000000 HC RX 637 (ALT 250 FOR IP): Performed by: INTERNAL MEDICINE

## 2022-03-28 PROCEDURE — 99239 HOSP IP/OBS DSCHRG MGMT >30: CPT | Performed by: NURSE PRACTITIONER

## 2022-03-28 RX ORDER — TRAZODONE HYDROCHLORIDE 50 MG/1
25-50 TABLET ORAL NIGHTLY PRN
Qty: 30 TABLET | Refills: 1 | Status: SHIPPED | OUTPATIENT
Start: 2022-03-28 | End: 2022-06-29

## 2022-03-28 RX ORDER — OXYCODONE HYDROCHLORIDE AND ACETAMINOPHEN 5; 325 MG/1; MG/1
TABLET ORAL
Qty: 90 TABLET | Refills: 0 | Status: SHIPPED | OUTPATIENT
Start: 2022-03-28 | End: 2022-04-25

## 2022-03-28 RX ORDER — CEPHALEXIN 500 MG/1
500 CAPSULE ORAL 2 TIMES DAILY
Qty: 8 CAPSULE | Refills: 0 | Status: SHIPPED | OUTPATIENT
Start: 2022-03-28 | End: 2022-04-01

## 2022-03-28 RX ADMIN — SODIUM CHLORIDE 1000 ML: 9 INJECTION, SOLUTION INTRAVENOUS at 04:31

## 2022-03-28 RX ADMIN — MISOPROSTOL 200 MCG: 200 TABLET ORAL at 09:05

## 2022-03-28 RX ADMIN — ENOXAPARIN SODIUM 30 MG: 100 INJECTION SUBCUTANEOUS at 09:05

## 2022-03-28 RX ADMIN — LEVOTHYROXINE SODIUM 75 MCG: 25 TABLET ORAL at 09:05

## 2022-03-28 RX ADMIN — CEFTRIAXONE SODIUM 1000 MG: 1 INJECTION, POWDER, FOR SOLUTION INTRAMUSCULAR; INTRAVENOUS at 12:50

## 2022-03-28 NOTE — PROGRESS NOTES
Progress Note    Admit Date:  3/26/2022    Subjective:  Ms. Manish Ren seen in ED. Pt sitting up in chair. Son visiting and plan is to talk with palliative care today to talk about plan of care. Objective:   Vitals:    03/28/22 0602   BP: (!) 148/83   Pulse: 83   Resp:    Temp:    SpO2: 97%       No intake or output data in the 24 hours ending 03/28/22 1118    Physical Exam:  Gen: No distress. Alert. Appears frail and chronically ill sitting up in chair  Eyes: PERRL. No sclera icterus. No conjunctival injection. ENT: No discharge. Pharynx clear. Neck: No JVD. Trachea midline. Resp: No accessory muscle use. No crackles. No wheezes. No rhonchi. CV: Regular rate. Regular rhythm. No murmur. No rub. No edema. GI: Non-tender. Non-distended. Normal bowel sounds. Skin: Warm and dry. No nodule on exposed extremities. No rash on exposed extremities. M/S: No cyanosis. No joint deformity. No clubbing. Neuro: Awake. Grossly nonfocal    Psych: Oriented x 1.  No anxiety or agitation.        Scheduled Meds:   levothyroxine  75 mcg Oral Daily    miSOPROStol  200 mcg Oral Daily    sodium chloride flush  5-40 mL IntraVENous 2 times per day    enoxaparin  30 mg SubCUTAneous Daily       Continuous Infusions:   sodium chloride      sodium chloride 1,000 mL (03/28/22 0431)       PRN Meds:  sodium chloride flush, sodium chloride, ondansetron **OR** ondansetron, polyethylene glycol, acetaminophen **OR** acetaminophen, ipratropium-albuterol, ipratropium-albuterol      Data:  CBC:   Recent Labs     03/26/22 1940 03/27/22  0754 03/28/22  1024   WBC 5.4 7.9 10.9   HGB 13.0 12.5 13.0   HCT 38.8 38.4 38.6   .6* 106.0* 102.4*    234 299     BMP:   Recent Labs     03/26/22 1940 03/27/22  0754 03/28/22  1024    136 138   K 4.9 5.1 4.2   CL 98* 99 102   CO2 32 25 20*   BUN 31* 27* 18   CREATININE 1.3* 1.1 1.0     LIVER PROFILE:   Recent Labs     03/26/22 1940   AST 32   ALT 18   LIPASE 20.0   BILITOT 0.6 ALKPHOS 75     PT/INR: No results for input(s): PROTIME, INR in the last 72 hours. CULTURES  Results for Ashley Farr" (MRN [id number]) as of 3/27/2022 10:37    Ref. Range 3/26/2022 19:40   SARS-CoV-2 RNA, RT PCR Latest Ref Range: NOT DETECTED  NOT DETECTED   Results for Ashley Farr" (MRN [id number]) as of 3/27/2022 10:37    Ref. Range 3/26/2022 19:40   INFLUENZA B Latest Ref Range: NOT DETECTED  NOT DETECTED   Results for Ashley Farr" (MRN [id number]) as of 3/27/2022 10:37    Ref. Range 3/26/2022 19:40   INFLUENZA A Latest Ref Range: NOT DETECTED         Urine Culture: pending      RADIOLOGY  CT Head WO Contrast   Final Result      1. No evidence of acute intracranial process. 2.  Minimal hypodensity involving a right superior lateral parietal gyrus   suggesting chronic ischemic change unchanged from the prior study. 3.  Findings of presumed small vessel ischemic deep white matter disease. 4.  Prominence of the sulci and/or CSF spaces suggests a degree of cerebral   atrophy. XR CHEST PORTABLE   Final Result   1. No definite radiographic evidence of acute cardiopulmonary disease. 2. Indeterminate 18 mm nodular density central lower right lung. Noncontrast   chest CT recommended on a nonemergent basis. 3. Pulmonary sequela typical of that seen with smoking, including COPD;   correlate with clinical history. 4. Chronic changes of pulmonary fibrosis. 5. Calcific atherosclerotic disease aorta. RECOMMENDATION:   Prompt, nonemergent noncontrast chest CT follow-up               Assessment/Plan:  Unresponsiveness, was awake and alert in the emergency department  Acute metabolic encephalopathy   - pt is awake and alert at this time  - discussed with family, pt stated that she recently has not been eating or drinking well.   She does have ensure with each meal. Also stated that she sometimes doesn't sleep for days, but had been sleeping well prior to

## 2022-03-28 NOTE — ED NOTES
DC instructions reviewed with pt family verbalized understanding.  BP (!) 140/78   Pulse 83   Temp 97.9 °F (36.6 °C) (Oral)   Resp 18   Ht 5' 1\" (1.549 m)   Wt 90 lb (40.8 kg)   SpO2 99%   BMI 17.01 kg/m²       Demetrio Trevino, RN  03/28/22 6702

## 2022-03-28 NOTE — PROGRESS NOTES
Inpatient Occupational Therapy  Evaluation and Treatment    Unit: ED  Date:  3/28/2022  Patient Name:    Dionna Bullock  Admitting diagnosis:  Hypoxia [R09.02]  Admit Date:  3/26/2022   Precautions/Restrictions/WB Status/ Lines/ Wounds/ Oxygen: fall risk, IV, bed/chair alarm and telemetry, [place], confusion    Treatment Time: 9:35-10:20  Treatment Number: 1     Billable Treatment Time: 35 minutes   Total Treatment Time:  45  minutes    Patient Goals for Therapy:  \" to go home \"      Discharge Recommendations: Home with 24/7 assist and home therapy  DME needs for discharge: Needs Met       Therapy recommendations for staff:   Assist of 1 with use of rolling walker (RW) for all transfers to/from BSC/chair    History of Present Illness: H & P as per Hanna Hampton MD's note dated 3/26/2022  80 y.o. female demented female who was brought to the emergency department by EMS for unresponsiveness.  The patient lives at home with family. Maira Just have tried to contact family without success.  But per ER staff she was found down and unresponsive.  The family attempted to try to wake her up without success so to call EMS.  Per reports he took EMS a couple of minutes before the patient was able to be aroused.  Vital signs were stable and her blood sugars were within normal limits.  In emergency department she was awake and alert, apparently she was hypoxic on room air and was started on supplemental oxygen otherwise she was stable.  She is demented and cannot provide much history.  She will be admitted for observation. Home Health S4 Level Recommendation:  Level 1 Standard  AM-PAC Score: AM-PAC Inpatient Daily Activity Raw Score: 16    Preadmission Environment    5/16/2019  Pt.  Lives With Family and 24/7 Assist Available  (son & daughter-in-law)  [place] environment[place]  Steps to enter first floor: No steps  Steps to second floor: N/A  Bathroom: Bath Tub Shower, Walk in [employer], [employer], [employer]  and raised toilet seat  Equipment owned: Rolling Walker, SPC, Shower Chair, transport chair, hospital bed, bed with adjustible frame without bed rails, lift chair  and BSC     Preadmission Status:  Pt. Able to drive: No  Pt Fully independent with ADLs: No (family assists with lower body), IND with feeding, IND with toileting  Pt. Required assistance from family for: Bathing, Cleaning, Cooking, Dressing and [address]  Pt. Fully independent for transfers and gait and walked with Walker, walks around the house by herself  History of falls 1 fall slide out of the bed but did not get injured and was able to get up from the floor  Patient had not been sleeping in the nights for 2-4 days in a row and trying to get out of the house. All house doors has alarm system. Pain  Yes  Rating:mild  Location: constipation   Pain Medicine Status: Denies need      Cognition    A&O Person   Able to follow 1 step commands   [place]    Subjective  Patient lying supine in bed with no family present   Pt agreeable to this OT eval & tx. Upper Extremity ROM:    B shoulder flexion impaired to ~80 degrees    Upper Extremity Strength:    BUE strength impaired but not formally assessed w/ MMT    Upper Extremity Sensation    Diminished    Upper Extremity Proprioception:  WFL    Coordination and Tone  Diminished    Balance  Functional Sitting Balance:  WFL  Functional Standing Balance:Diminished (CGA)    Bed mobility:    Supine to sit:   Min A  Sit to supine:   Not Tested  Rolling:    Not Tested  Scooting in sitting:  Min A  Scooting to head of bed:   Not Tested    Bridging:   Not Tested    Transfers:    Sit to stand:  CGA  Stand to sit:  CGA  Bed to chair:   Min A and with use of RW  Standard toilet: Not Tested  Bed to BSC:  Min A and with use of RW    Dressing:      UE:   Not Tested  LE:    Not Tested    Bathing:    UE:  Not Tested  LE:  Not Tested    Eating:   Not Tested    Toileting:   Max A    Grooming: Not Tested    Activity Tolerance   Pt completed therapy session with No adverse symptoms noted w/activity  SpO2: 99%  HR: 100-120 bpm  BP:     Positioning Needs:   Up in chair, call light and needs in reach. Exercise / Activities Initiated:   N/A    Patient/Family Education:   Role of OT  Recommendations for DC  Safe RW use/hand placement    Assessment of Deficits: Pt seen for Occupational therapy evaluation in acute care setting. Pt demonstrated decreased Activity tolerance, ADLs, IADLs, Balance , Bathing, Bed mobility, Dressing, ROM, Safety Awareness, Strength, Transfers, Cognition and Coping Skills. Pt functioning below baseline and will likely benefit from skilled occupational therapy services to maximize safety and independence. Goal(s) : To be met in 3 Visits:  1). Bed to toilet/BSC: SBA and with use of RW    To be met in 5 Visits:  1). Supine to/from Sit:  SBA  2). Upper Body Bathing:   SBA  3). Lower Body Bathing:   Min A  4). Upper Body Dressing:  SBA  5). Lower Body Dressing:  Min A  6). Pt to demonstrate UE exs x 15 reps with minimal cues    Rehabilitation Potential:  Good for goals listed above. Strengths for achieving goals include: Pt motivated, PLOF, Family Support and Pt cooperative  Barriers to achieving goals include:  Complexity of condition     Plan: To be seen 3-5 x/wk while in acute care setting for therapeutic exercises, bed mobility, transfers, dressing, bathing, family/patient education, ADL/IADL retraining, energy conservation training.        SELENE Gurrola/L #[id number]      If patient discharges from this facility prior to next visit, this note will serve as the Discharge Summary

## 2022-03-28 NOTE — TELEPHONE ENCOUNTER
Dm Winn from [facility] called wanting a verbal for home care.      Dm Winn [phone]

## 2022-03-28 NOTE — CARE COORDINATION
Case Management Assessment  Initial Evaluation      Patient Name: Marco Mahajan  YOB: 1935  Diagnosis: Hypoxia [R09.02]  Date / Time: 3/26/2022  6:59 PM    Admission status/Date:inpt  Chart Reviewed: Yes      Patient Interviewed: No   Family Interviewed:  Yes - Belinda rai      Hospitalization in the last 30 days:  No      Health Care Decision Maker :   Primary Decision Maker: JonathonWiliane - Child - [phone]    Secondary Decision Maker: Marcia Natalie - Child - [phone]    (CM - must 1st enter selection under Navigator - emergency contact- [place] Relationship and pick relationship)   Who do you trust or have selected to make healthcare decisions for you      Met with: pt's dil via TC  Interview conducted  (bedside/phone):    Current PCP:   Marshall Cuenca,       Financial  Commercial  Precert required for SNF : Y, N          3 night stay required - Y, N    ADLS  Support Systems/Care Needs: Family Members,Children  Transportation: family    Meal Preparation: family    Housing  Living Arrangements: home with son,cecelia  Steps: one  Intent for return to present living arrangements: Yes  Identified Issues: no    Home Care Information  Active with [address] : No Agency:(Services)     Passport/Waiver : No  :                      Phone Number:    Passport/Waiver Services: no          Durable Medical Equiptment   DME Provider:   Equipment:   Walker_x__Cane___RTS___ BSC___Shower Chair_x__Hospital Bed___W/C_x___Other___lift chair_____  02 at ____Liter(s)---wears(frequency)_______ HHN ___ CPAP___ BiPap___   N/A____      Home O2 Use :  No    If No for home O2---if presently on O2 during hospitalization:  No  if yes CM to follow for potential DC O2 need  Informed of need for care provider to bring portable home O2 tank on day of discharge for nursing to connect prior to leaving:   Not Indicated  Verbalized agreement/Understanding:   Not Indicated    Community Service Affiliation  Dialysis:  No    · Agency:  · Location:  · Dialysis Schedule:  · Phone:   · Fax: Other Community Services: (ex:PT/OT,Mental Health,Wound Clinic, Cardio/Pul [address])    DISCHARGE PLAN: Explained Case Management role/services. Reviewed chart. Pt oriented x 1, frail and chronically ill per ED MD notes. Writer spoke with pt's dil/caregiver,Belinda, via TC for initial interview. Pt from home with son,Elkin and Belinda rai and plan return. PT rec home with 24/7 assist and home PT. Per Belinda rai, pt uses walker at baseline and w/c on outings. Pt has 24/7 assistance at home and declining home PT. See PC notes. [address] meeting with family today. Follow.

## 2022-03-28 NOTE — DISCHARGE SUMMARY
Name:  Charli Elder  Room:  20/20  MRN:    [id number]    Discharge Summary      This discharge summary is in conjunction with a complete physical exam done on the day of discharge. Discharging Provider: RADAMSE Back CNP       Admit: 3/26/2022  Discharge:   03/28/22     HPI taken from admission H&P:    80 y.o. female demented female who was brought to the emergency department by EMS for unresponsiveness. The patient lives at home with family. I have tried to contact family without success. But per ER staff she was found down and unresponsive. The family attempted to try to wake her up without success so to call EMS. Per reports he took EMS a couple of minutes before the patient was able to be aroused. Vital signs were stable and her blood sugars were within normal limits. In emergency department she was awake and alert, apparently she was hypoxic on room air and was started on supplemental oxygen otherwise she was stable. She is demented and cannot provide much history. She will be admitted for observation.       Diagnoses this Admission and Hospital Course   Unresponsiveness, was awake and alert in the emergency department  Acute metabolic encephalopathy --resolved   - pt is awake and alert at this time  - discussed with family, pt stated that she recently has not been eating or drinking well.  She does have ensure with each meal. Also stated that she sometimes doesn't sleep for days, but had been sleeping well prior to this episode. - troponin <0.01 on admission  - CT of head completed and showed no acute intracranial process  - pt was admitted to PCU with telemetry, ok to transfer to [address] [place] [address] with telemetry and continuous pulse ox      Hypoxia--resolved   Pulmonary nodule noted on CT  - on 2 liters --- now on room air 97%.  Need to check saturations with ambulation prior to discharge, discussed with nursing.  - monitored on continuous pulse ox  - wean as tolerated   - pulmonary nodule noted on CT, nonemergent CT chest recommended. Consider obtaining this while inpatient if hypoxia continues. - Discussed chest x-ray results and pulmonary nodule with son, he doesn't want any further testing completed at this time.     Elevated creatinine--resolved   - 1.3 on admission--1.1--1.0  - likely 2/2 poor po intake, held enalapril--resumed at discharge   - continue IVF at 75 ml/hr -discontinue today      UTI  - will start Rocephin today  - urine culture noted with 50,000 escherichia coli, will discharge on Kefelx 500 mg BID for 4 more days as she received Rocephin today.   - likely contributed  to acute metabolic encephalopathy-- now resolved       Generalized weakness   - PT/OT consulted  - recommend home with 24/7 care     Dementia  Failure to Thrive  - appears this is getting worse  - patient has not been eating or drinking  - palliative consult per family request, goal is to keep patient comfortable   - per case management, patient not a candidate for hospice care at this time, will discharge with palliative care and [facility].     Hypertension  - held enalapril---resume at discharge   - monitored     Hypothyroidism  - TSH elevated, currently on 75 mcg  - need to verify with family if patient has been compliant with medication  - pt compliant with medication, discussed TSH with family and will follow up outpatient with PCP for repeat TSH       Underweight  - will consult dietician   - family stated she drinks ensure with every meal, continue     Osteoarthritis       Procedures (Please Review Full Report for Details)  None     Consults    Dietician       Physical Exam at Discharge:    BP (!) 148/83   Pulse 83   Temp 97.9 °F (36.6 °C) (Oral)   Resp 23   Ht 5' 1\" (1.549 m)   Wt 90 lb (40.8 kg)   SpO2 97%   BMI 17.01 kg/m²     See progress note day of discharge    CBC:   Recent Labs     03/26/22  1940 03/27/22  0754 03/28/22  1024   WBC 5.4 7.9 10.9   HGB 13.0 12.5 13.0   HCT 38.8 38.4 38.6   .6* 106.0* 102.4*    234 299     BMP:   Recent Labs     03/26/22  1940 03/27/22  0754 03/28/22  1024    136 138   K 4.9 5.1 4.2   CL 98* 99 102   CO2 32 25 20*   BUN 31* 27* 18   CREATININE 1.3* 1.1 1.0     LIVER PROFILE:   Recent Labs     03/26/22  1940   AST 32   ALT 18   LIPASE 20.0   BILITOT 0.6   ALKPHOS 75     PT/INR: No results for input(s): PROTIME, INR in the last 72 hours. APTT: No results for input(s): APTT in the last 72 hours. UA:  Recent Labs     03/27/22 1814   COLORU Yellow   PHUR 6.5   WBCUA 3-5   RBCUA 3-4   MUCUS 2+*   BACTERIA 4+*   CLARITYU SL CLOUDY*   SPECGRAV 1.020   LEUKOCYTESUR SMALL*   UROBILINOGEN 2.0*   BILIRUBINUR Negative   BLOODU MODERATE*   GLUCOSEU Negative         CULTURES  Procedure Component Value Units Date/Time   Culture, Urine [[id number]] (Abnormal) Collected: 03/27/22 1821   Order Status: Completed Specimen: Urine, clean catch Updated: 03/28/22 1409    Organism Escherichia coli Abnormal     Urine Culture, Routine --    50,000 CFU/ml   No further workup    Narrative:       Blood Cultures: NGTD  COVID-19     RADIOLOGY  CT Head WO Contrast   Final Result      1. No evidence of acute intracranial process. 2.  Minimal hypodensity involving a right superior lateral parietal gyrus   suggesting chronic ischemic change unchanged from the prior study. 3.  Findings of presumed small vessel ischemic deep white matter disease. 4.  Prominence of the sulci and/or CSF spaces suggests a degree of cerebral   atrophy. XR CHEST PORTABLE   Final Result   1. No definite radiographic evidence of acute cardiopulmonary disease. 2. Indeterminate 18 mm nodular density central lower right lung. Noncontrast   chest CT recommended on a nonemergent basis. 3. Pulmonary sequela typical of that seen with smoking, including COPD;   correlate with clinical history. 4. Chronic changes of pulmonary fibrosis. 5. Calcific atherosclerotic disease aorta.       RECOMMENDATION: Prompt, nonemergent noncontrast chest CT follow-up               Discharge Medications     Medication List      START taking these medications    cephALEXin 500 MG capsule  Commonly known as: KEFLEX  Take 1 capsule by mouth 2 times daily for 4 days        CHANGE how you take these medications    gabapentin 300 MG capsule  Commonly known as: NEURONTIN  TAKE 1 CAPSULE AT BEDTIME  What changed: See the new instructions. levothyroxine 50 MCG tablet  Commonly known as: SYNTHROID  TAKE 1 TABLET DAILY  What changed: See the new instructions. CONTINUE taking these medications    diphenhydrAMINE 25 MG capsule  Commonly known as: BENADRYL     enalapril 5 MG tablet  Commonly known as: VASOTEC  TAKE 1 TABLET DAILY     miSOPROStol 200 MCG tablet  Commonly known as: CYTOTEC  TAKE 1 TABLET DAILY     oxyCODONE-acetaminophen 5-325 MG per tablet  Commonly known as: PERCOCET  TAKE 1 TABLET BY MOUTH EVERY 8 HOURS AS NEEDED FOR PAIN FOR UP TO 30 DAYS. - REDUCE DOSES TAKEN AS PAIN BECOMES MANAGEABLE     traZODone 50 MG tablet  Commonly known as: DESYREL  TAKE 0.5-1 TABLETS BY MOUTH NIGHTLY AS NEEDED FOR SLEEP           Where to Get Your Medications      These medications were sent to [address] [place] [address], [address] [place] [address] 67, [address] [address]    Phone: [phone]   · cephALEXin 500 MG capsule           Discharged in stable condition to home with palliative care and [facility]    Follow Up: Follow up with PCP in 1 week, will need follow up TSH     Spent greater than 35 minutes between discussing with family, discharge planning and nursing regarding the discharge plan of care.      Estrellita De La Cruz, APRN - CNP

## 2022-03-28 NOTE — PLAN OF CARE
Nutrition Problem #1: Inadequate oral intake  Intervention: Food and/or Nutrient Delivery: Continue Current Diet,Start Oral Nutrition Supplement  Nutritional Goals: patient will accept and consume at least 26-50% of her meals on ADULT DIET;  Dysphagia - Soft and Bite-Sized diet order x 3 meals per day + she will accept and consume at least 50% of her Ensure Enlive with meals Spouse/Significant other

## 2022-03-28 NOTE — PROGRESS NOTES
AM assessment completed, see flow sheet. Pt is alert and oriented to self only. Vital signs are WNL. Respirations are even & easy. No complaints voiced. Pt denies needs at this time. SR up x 2, and bed in low position. Call light is within reach.

## 2022-03-28 NOTE — CONSULTS
Palliative Care Initial Note  Palliative Care Admit date: 03/28/2022    Advance Directives: Limited,medication only    Plan of care/goals:home with family and possible HOC    Social/Spiritual: Prayer Support    Plan: Reviewed chart. Writer spoke with pt's dil/caregiver,Belinda, via TC and family chose [address] as they have used them in the past for her parents. Referral called to [facility] with [address] and she states will set up meeting time with family today. fs sent to [address] at this time. Awaiting meeting time. Dorys SALAZAR, aware of above POC. Following    11:42AM writer spoke with [facility] from [address] and family meeting is set up for [phone] today. Following.       Reason for consult: Goals of Care,Family Support, Symptom Management    ___ Advance Care Planning  _x_ Transition of Care Planning  ___ Psychosocial/Spiritual Support  ___ Symptom Management    Ibeth Ledesma RN Palliative Care

## 2022-03-28 NOTE — PROGRESS NOTES
Comprehensive Nutrition Assessment    Type and Reason for Visit:  Initial,Consult (consult for poor appetite/po intake x 5+ days)    Nutrition Recommendations/Plan:   1. Continue ADULT DIET; Dysphagia - Soft and Bite-Sized diet order. 2. Added Ensure Enlive with meals. 3. Monitor appetite, meal intake, and ONS intake. 4. Monitor outcome of family meeting with hospice this afternoon. 5. Please obtain an actual, current weight for this admission - only a stated weight was obtained upon admission. 6. Monitor nutrition-related labs, bowel function, and weight trends. Nutrition Assessment:  patient is nutritionally compromised AEB decreased appetite/po intake PTA and patient's overall medical status has been declining recently (per family); patient is at risk for further compromise d/t ongoing decreased appetite/po intake and underweight status; will continue ADULT DIET; Dysphagia - Soft and Bite-Sized diet order + added Ensure Enlive with meals today    Malnutrition Assessment:  Malnutrition Status: At risk for malnutrition    Context:  Acute Illness     Findings of the 6 clinical characteristics of malnutrition:  Energy Intake:  7 - 50% or less of estimated energy requirements for 5 or more days  Weight Loss:  Unable to assess (only a stated stated weight was obtained upon admission)     Body Fat Loss:  Unable to assess     Muscle Mass Loss:  Unable to assess    Fluid Accumulation:  No significant fluid accumulation     Strength:  Not Performed    Estimated Daily Nutrient Needs:  Energy (kcal):  1230 - 1312 kcals based on 30-32 kcals/kg/CBW; Weight Used for Energy Requirements:  Current (based on a stated weight of 90#)     Protein (g):  62 - 66 g protein based on 1.5-1.6 g/kg/CBW;  Weight Used for Protein Requirements:  Current        Fluid (ml/day):  1230 - 1312 ml; Method Used for Fluid Requirements:  1 ml/kcal      Nutrition Related Findings:  patient is A & O to person only; abdomen is flat, soft, non-tender, and bowel sounds are active; last documented BM was on 3/28/22; patient consumed 1-25% of dinner on 3/27/22; patient is from home where she lives with her son and DIL; patient + patient's family are meeting with hospice today; patient has synthroid, lovenox, cytotec, and NS at 75 ml/hr ordered at this time; GFR = 53      Wounds:   (per flow sheet, possible pressure wound on medial back)       Current Nutrition Therapies:    ADULT DIET; Dysphagia - Soft and Bite Sized  ADULT ORAL NUTRITION SUPPLEMENT; Breakfast, Lunch, Dinner; Standard High Calorie/High Protein Oral Supplement    Anthropometric Measures:  · Height: 5' 1\" (154.9 cm)  · Current Body Weight: 90 lb (40.8 kg) (obtained on 3/28/22; stated weight)   · Admission Body Weight: 90 lb (40.8 kg) (obtained on 3/28/22; stated weight)    · Usual Body Weight: 103 lb 13 oz (47.1 kg) (obtained on 8/5/19; actual weight)     · Ideal Body Weight: 105 lbs; % Ideal Body Weight 85.7 %   · BMI: 17  · BMI Categories: Underweight (BMI less than 22) age over 72       Nutrition Diagnosis:   · Inadequate oral intake related to inadequate protein-energy intake,psychological cause or life stress,cognitive or neurological impairment as evidenced by poor intake prior to admission,intake 0-25%,lab values,other (comment) (decreased appetite/po intake; overall declining medical status)      Nutrition Interventions:   Food and/or Nutrient Delivery:  Continue Current Diet,Start Oral Nutrition Supplement  Nutrition Education/Counseling:  No recommendation at this time   Coordination of Nutrition Care:  Continue to monitor while inpatient,Interdisciplinary Rounds    Goals:  patient will accept and consume at least 26-50% of her meals on ADULT DIET;  Dysphagia - Soft and Bite-Sized diet order x 3 meals per day + she will accept and consume at least 50% of her Ensure Enlive with meals       Nutrition Monitoring and Evaluation:   Behavioral-Environmental Outcomes:  None Identified   Food/Nutrient Intake Outcomes:  Food and Nutrient Intake,Supplement Intake,IVF Intake  Physical Signs/Symptoms Outcomes:  Biochemical Data,Chewing or Swallowing,Meal Time Behavior,Nutrition Focused Physical Findings,Skin,Weight     Discharge Planning:    Continue current diet,Continue Oral Nutrition Supplement     Electronically signed by Jalen Maya RD, LD on 3/28/22 at 2:23 PM EDT    Contact: [phone]

## 2022-03-28 NOTE — TELEPHONE ENCOUNTER
Refill Request     Last Seen: Last Seen Department: 3/14/2022  Last Seen by PCP: Visit date not found    Last Written: 02/28/2022 30 Tablet 1 Refill    Next Appointment:   Future Appointments   Date Time Provider Jose M Ivory   4/11/2022  2:00 PM DO MARSHALL Kee [place]i - DYD       Future appointment scheduled      Requested Prescriptions     Pending Prescriptions Disp Refills    traZODone (DESYREL) 50 MG tablet [Pharmacy Med Name: TRAZODONE HCL 50 MG ORAL TABLET] 30 tablet 1     Sig: TAKE 0.5-1 TABLETS BY MOUTH NIGHTLY AS NEEDED FOR SLEEP

## 2022-03-28 NOTE — DISCHARGE INSTR - COC
Continuity of Care Form    Patient Name: Malinda Blackman   :  1935  MRN:  [id number]    Admit date:  3/26/2022  Discharge date:  3/28/22    Code Status Order: Limited   Advance Directives:      Admitting Physician:  No admitting provider for patient encounter. PCP: Corinne Chun DO    Discharging Nurse: Ta Pizarro RN   [address] Unit/Room#:   Discharging Unit Phone Number:    Emergency Contact:   Extended Emergency Contact Information  Primary Emergency Contact: Elkin Holt  [facility]  Home Phone: [phone]  Mobile Phone: [phone]  Relation: Child  Secondary Emergency Contact: [place], [address]  Home Phone: [phone]  Mobile Phone: [phone]  Relation: Child    Past Surgical History:  Past Surgical History:   Procedure Laterality Date    INTERTROCHANTERIC HIP FRACTURE SURGERY  3/13    Lt    JOINT REPLACEMENT      Rt hip    JOINT REPLACEMENT      Rt knee    SPINE SURGERY         Immunization History:   Immunization History   Administered Date(s) Administered    COVID-19, Billie Green, Primary or Immunocompromised, PF, 100mcg/0.5mL 03/10/2021, 2021    Influenza Vaccine, unspecified formulation 10/20/2016    Influenza Virus Vaccine 2015, 10/20/2016    Influenza, High Dose (Fluzone 65 yrs and older) 10/20/2016, 2017, 2018    Influenza, Quadv, adjuvanted, 65 yrs +, IM, PF (Fluad) 2020, 10/26/2021    Influenza, Triv, inactivated, subunit, adjuvanted, IM (Fluad 65 yrs and older) 2019    Pneumococcal Conjugate 13-valent (Elige Sleight) 2015    Pneumococcal Polysaccharide (Lircxhtye38) 2015, 10/20/2016       Active Problems:  Patient Active Problem List   Diagnosis Code    Hypothyroidism E03.9    Osteoarthritis M19.90    Osteoporosis M81.0    IBS (irritable bowel syndrome) K58.9    Essential hypertension I10    Anxiety and depression F41.9, F32. A    Palpitations R00.2    Pain medication agreement signed Z02.89    Anemia D64.9    Pelvic fracture (HCC) S32. 9XXA    Insomnia due to medical condition G47.01    Vascular dementia ([facility] [address] F01.50    Insomnia G47.00    Localized swelling, mass and lump, neck R22.1    Chronic pain syndrome G89.4    Community acquired pneumonia J18.9    Hyponatremia E87.1    Lactic acidosis E87.2    Multifocal pneumonia J18.9    Acute encephalopathy G93.40    Fever R50.9    Leukocytosis D72.829    Hypoxia R09.02    Altered mental status R41.82    Dementia with behavioral disturbance (HCC) F03.91    Elevated serum creatinine R79.89    Failure to thrive in adult R62.7    Generalized weakness R53.1       Isolation/Infection:   Isolation            No Isolation          Patient Infection Status       Infection Onset Added Last Indicated Last Indicated By Review Planned Expiration Resolved Resolved By    None active    Resolved    COVID-19 (Rule Out) 03/26/22 03/26/22 03/26/22 COVID-19 & Influenza Combo (Ordered)   03/26/22 Rule-Out Test Resulted            Nurse Assessment:  Last Vital Signs: BP (!) 148/83   Pulse 83   Temp 97.9 °F (36.6 °C) (Oral)   Resp 23   Ht 5' 1\" (1.549 m)   Wt 90 lb (40.8 kg)   SpO2 97%   BMI 17.01 kg/m²     Last documented pain score (0-10 scale):    Last Weight:   Wt Readings from Last 1 Encounters:   03/26/22 90 lb (40.8 kg)     Mental Status:  disoriented, alert, and oriented to self     IV Access:  - None    Nursing Mobility/ADLs:  Walking   Assisted  Transfer  Assisted  Bathing  Assisted  Dressing  Assisted  Toileting  Assisted  Feeding  Assisted  Med Admin  Assisted  Med Delivery   none    Wound Care Documentation and Therapy:  Wound 05/16/19 Heel Left very slow to pepe heel redness stage 1 (Active)   Number of days: 1047       Wound 05/15/19 Heel Right;Posterior very slow to pepe redness right heel, soft (Active)   Number of days: 1047        Elimination:  Continence:    Bowel: continent/incontinent  Bladder: continent/incontinent  Urinary Catheter: None   Colostomy/Ileostomy/Ileal Conduit: No       Date of Last BM: 3/28/22  No intake or output data in the 24 hours ending 03/28/22 1452  No intake/output data recorded. Safety Concerns: At Risk for Falls    Impairments/Disabilities:      None    Nutrition Therapy:  Current Nutrition Therapy:   - Oral Diet:  Dysphagia 3 advanced    Routes of Feeding: Oral  Liquids: Thin Liquids  Daily Fluid Restriction: no  Last Modified Barium Swallow with Video (Video Swallowing Test): not done    Treatments at the Time of Hospital Discharge:   Respiratory Treatments: NA  Oxygen Therapy:  is not on home oxygen therapy.   Ventilator:    - No ventilator support    Rehab Therapies: Physical Therapy and Occupational Therapy  Weight Bearing Status/Restrictions: No weight bearing restrictions  Other Medical Equipment (for information only, NOT a DME order):  na  Other Treatments: NA    Patient's personal belongings (please select all that are sent with patient):  clothing    RN SIGNATURE:  Electronically signed by Louis Briones RN on 3/28/22 at 4:39 PM EDT    CASE MANAGEMENT/SOCIAL WORK SECTION    Inpatient Status Date: 03/26/22    Readmission Risk Assessment Score:  Readmission Risk              Risk of Unplanned Readmission:  11           Discharging to Facility/ Agency   Name: [facility] SN,PT/OT      / signature: Electronically signed by Guille Drew RN on 3/28/22 at 2:52 PM EDT    PHYSICIAN SECTION    Prognosis: {Prognosis:1376666407}    Condition at Discharge: [address] Patient Condition:[id number]}    Rehab Potential (if transferring to Rehab): {Prognosis:1285635005}    Recommended Labs or Other Treatments After Discharge: ***    Physician Certification: I certify the above information and transfer of Jaun Coelho  is necessary for the continuing treatment of the diagnosis listed and that she requires {Admit to Appropriate Level of Care:11430} for {GREATER/LESS:334175969} 30 days.      Update Admission H&P: {CHP DME Changes in KMWGL:145391002}    PHYSICIAN SIGNATURE:  Electronically signed by RADAMES Tiwari/CNP/[place], RN on 3/28/22 at 2:52 PM EDT

## 2022-03-28 NOTE — CARE COORDINATION
DISCHARGE ORDER  Date/Time 3/28/2022 3:07 PM  Completed by: Harvey Yu RN, Case Management    Patient Name: Frederic Batista      : 1935  Admitting Diagnosis: Hypoxia [R09.02]      Admit order Date and Status:inpt  (verify MD's last order for status of admission)      Noted discharge order. If applicable PT/OT recommendation at Discharge: home with 24 hr assist and with home PT  DME recommendation by PT/OT:needs met  Confirmed discharge plan  (pt's dil/caregiver)    Discharge Plan: Reviewed chart. Plan continues home with family with [facility] for SN,PT/OT and [facility] with Juani Day with [address] [facility] here to arrange [facility] and writer spoke with Vic Camacho from [employer] and she has epic access and will set up hhc at d/c today. Per Belinda,dil/caregiver will be here at 1700 to transport pt to home. Pt does not qualify for Hospice        Reviewed chart. Role of discharge planner explained and patient verbalized understanding. Discharge order is noted. Has Home O2 in place on admit:  No  Informed of need to bring portable home O2 tank on day of discharge for nursing to connect prior to leaving:   Not Indicated  Verbalized agreement/Understanding:   Not Indicated  Pt is being d/c'd to home today. Pt's O2 sats are 97% on RA. Discharge timeout done with DONALD Quintero. All discharge needs and concerns addressed.

## 2022-03-29 ENCOUNTER — TELEPHONE (OUTPATIENT)
Dept: FAMILY MEDICINE CLINIC | Age: 87
End: 2022-03-29

## 2022-03-29 ENCOUNTER — CARE COORDINATION (OUTPATIENT)
Dept: CASE MANAGEMENT | Age: 87
End: 2022-03-29

## 2022-03-29 DIAGNOSIS — G93.40 ACUTE ENCEPHALOPATHY: Primary | ICD-10-CM

## 2022-03-29 LAB
ORGANISM: ABNORMAL
URINE CULTURE, ROUTINE: ABNORMAL

## 2022-03-29 PROCEDURE — 1111F DSCHRG MED/CURRENT MED MERGE: CPT | Performed by: FAMILY MEDICINE

## 2022-03-29 NOTE — TELEPHONE ENCOUNTER
[facility] calling to inform us they were supposed to come to pt's house tomorrow but daughter in law is not letting them come until Friday.

## 2022-03-29 NOTE — TELEPHONE ENCOUNTER
Patient will not be home so they will not be available for the appt tomorrow so they moved it to Friday. Belinda(daughter in law) states that she \"cant do it tomorrow and I told the woman I spoke to that I couldn't do it tomorrow that is why we moved it to Friday. \"      I hope this is helpful

## 2022-03-29 NOTE — CARE COORDINATION
[facility] Transitions Initial Follow Up Call    Call within 2 business days of discharge: Yes    Patient: Romina Lewis Patient : 1935   MRN: [id number]  Reason for Admission: unresponsiveness, acute metabolic encephalopathy, hypoxia, pulm nodule on CT, elevated creatinine, UTI, generalized weakness, dementia, failure to thrive, HTN, hypothyroidism, underweight -> [facility], [facility] referral  Discharge Date: 3/28/22 RARS: Readmission Risk Score: 10.1 ( )      Last Discharge [facility]       Complaint Diagnosis Description Type Department Provider    3/26/22 Fall Altered mental status, unspecified altered mental status type . .. ED (DISCHARGE) [address] ED Hiren Kirkpatrick MD; Jessi Shrestha... Was this an external facility discharge? No Discharge Facility: NA    Challenges to be reviewed by the provider   Additional needs identified to be addressed with provider: no       Method of communication with provider : none    Advance Care Planning:   Does patient have an Advance Directive: reviewed and current. Was this a readmission? No  Patient stated reason for admission: unresponsiveness  Patients top risk factors for readmission:   functional cognitive ability  functional physical ability  medical condition-     Care Transition Nurse (CTN) contacted the caregiver by telephone to perform post hospital discharge assessment. Provided introduction to self, and explanation of the CTN role. CTN reviewed discharge instructions, medical action plan and red flags with caregiver who verbalized understanding. Caregiver given an opportunity to ask questions and does not have any further questions or concerns at this time. Were discharge instructions available to patient? Yes. Reviewed appropriate site of care based on symptoms and resources available to patient including: PCP  Home health. The caregiver agrees to contact the PCP office for questions related to their healthcare. Medication reconciliation was performed with caregiver, who verbalizes understanding of administration of home medications. COVID Risk Education    [id number] Not Detected on 3/26/2022. Patient completed 2-step Moderna vaccine per chart review. Caregiver was given an opportunity to verbalize any questions and concerns and agrees to contact CTN or health care provider for questions related to their healthcare. Spoke with daughter-in-law who is caregiver. Taking abx as ordered. Reports she is doing well since home. Did have one loose BM. She doesn't have a big appetite. Prefers sweets. Did eat some fries and half [employer] sandwich after discharge. reviewed Ensure with meals recommendation. She is aware. Reviewed to take thyroid medication 75mcg dose daily 1 hour before breakfast and discuss dose at PCP OV. [facility] has not called to schedule yet. States today is best day to call and home number or listed mobile best contacts. DIL will not be home tomorrow. CTN will follow up with [facility] to confirm referral and notify them of DIL's schedule. TCM visit scheduled for day 7 with Dr Phillip Oneill per DIL preference as Ms Nadege Callaway has seen him before and Dr Nurys Quispe did not have sooner availability. She is aware existing appt with Dr Nurys Quispe still scheduled for 4/11 and will discuss at [address] if needed. Denies needs otherwise. CTN provided contact information for future needs. Outreach to [facility] HC. Per Florence Willingham, they received approval for [facility] from PCP and plan to contact today to schedule SOC. Plan for follow-up call in 7-10 days based on severity of symptoms and risk factors.   Plan for next call: follow up appointment-4/7    Follow Up  Future Appointments   Date Time Provider Jose M Ivory   4/4/2022 10:30 AM DO MARSHALL Greene   4/11/2022  2:00 PM DO MARSHALL Salgado RN

## 2022-03-30 LAB
BLOOD CULTURE, ROUTINE: NORMAL
CULTURE, BLOOD 2: NORMAL

## 2022-04-01 ENCOUNTER — TELEPHONE (OUTPATIENT)
Dept: FAMILY MEDICINE CLINIC | Age: 87
End: 2022-04-01

## 2022-04-01 NOTE — TELEPHONE ENCOUNTER
Meg Hernández home health nurse did start of care today and states the patient only needs 3 weeks of care with PT & OT ordered. Family has agreed to palliative care.

## 2022-04-04 ENCOUNTER — OFFICE VISIT (OUTPATIENT)
Dept: FAMILY MEDICINE CLINIC | Age: 87
End: 2022-04-04
Payer: MEDICARE

## 2022-04-04 VITALS
WEIGHT: 86.4 LBS | OXYGEN SATURATION: 93 % | BODY MASS INDEX: 16.33 KG/M2 | DIASTOLIC BLOOD PRESSURE: 65 MMHG | HEART RATE: 59 BPM | SYSTOLIC BLOOD PRESSURE: 109 MMHG

## 2022-04-04 DIAGNOSIS — E03.9 ACQUIRED HYPOTHYROIDISM: ICD-10-CM

## 2022-04-04 DIAGNOSIS — I10 ESSENTIAL HYPERTENSION: Primary | ICD-10-CM

## 2022-04-04 DIAGNOSIS — R62.7 FAILURE TO THRIVE IN ADULT: ICD-10-CM

## 2022-04-04 DIAGNOSIS — F01.50 VASCULAR DEMENTIA WITHOUT BEHAVIORAL DISTURBANCE (HCC): ICD-10-CM

## 2022-04-04 DIAGNOSIS — D75.89 MACROCYTOSIS: ICD-10-CM

## 2022-04-04 PROCEDURE — 1111F DSCHRG MED/CURRENT MED MERGE: CPT | Performed by: STUDENT IN AN ORGANIZED HEALTH CARE EDUCATION/TRAINING PROGRAM

## 2022-04-04 PROCEDURE — 99495 TRANSJ CARE MGMT MOD F2F 14D: CPT | Performed by: STUDENT IN AN ORGANIZED HEALTH CARE EDUCATION/TRAINING PROGRAM

## 2022-04-04 RX ORDER — LEVOTHYROXINE SODIUM 0.07 MG/1
75 TABLET ORAL DAILY
Qty: 90 TABLET | Refills: 1 | Status: SHIPPED | OUTPATIENT
Start: 2022-04-04

## 2022-04-04 NOTE — PROGRESS NOTES
Post-Discharge Transitional Care Follow Up      Atha Moritz   YOB: 1935    Date of Office Visit:  4/4/2022  Date of Hospital Admission: 3/26/22  Date of Hospital Discharge: 3/28/22  Readmission Risk Score (high >=14%. Medium >=10%):Readmission Risk Score: 10.1 ( )      Care management risk score Rising risk (score 2-5) and Complex Care (Scores >=6): 4     Non face to face  following discharge, date last encounter closed (first attempt may have been earlier): 3/29/2022 12:08 PM     Call initiated 2 business days of discharge: Yes     Essential hypertension  At goal today  -     CT DISCHARGE MEDS RECONCILED W/ CURRENT OUTPATIENT MED LIST  Vascular dementia without behavioral disturbance [facility])  Discussed DNR status with son and completed paperwork today. -     CT DISCHARGE MEDS RECONCILED W/ CURRENT OUTPATIENT MED LIST  Failure to thrive in adult  Has improved appetite since being home from hospital  -     CT DISCHARGE MEDS RECONCILED W/ CURRENT OUTPATIENT MED LIST  Macrocytosis  Noted on lab work and will check B12/folate  -     Vitamin B12 & Folate; Future  Acquired hypothyroidism  Given significant TSH elevation will increase Synthroid to 75 mcg daily. Recheck TSH in 1 month  -     TSH with Reflex; Future  -     levothyroxine (SYNTHROID) 75 MCG tablet; Take 1 tablet by mouth daily, Disp-90 tablet, R-1Normal      Medical Decision Making: moderate complexity  No follow-ups on file. Subjective:   HPI    Inpatient course: Discharge summary reviewed- see chart. Interval history/Current status:     Pulmonoary nodule  Noted incidentally on CXR  Family does not wish further evaluation    Poor PO intake  Eating much better since leaving the hospital    UTI  D/C with Keflex  Completed antibiotics.   No further symptoms    Weakeness  Home care    Dementia  FTT  paliative care and HHC now coming to the house  Discussed DNR status and completed paperwork with son/POA today    Elevated TSH -26.17 with normal T4  Increased synthroid to 75 while hospitalized an now 50. Patient Active Problem List   Diagnosis    Hypothyroidism    Osteoarthritis    Osteoporosis    IBS (irritable bowel syndrome)    Essential hypertension    Anxiety and depression    Palpitations    Pain medication agreement signed    Anemia    [facility] fracture [facility]    Insomnia due to medical condition    Vascular dementia ([facility]    Insomnia    Localized swelling, mass and lump, neck    Chronic pain syndrome    Community acquired pneumonia    Hyponatremia    Lactic acidosis    Multifocal pneumonia    Acute encephalopathy    Fever    Leukocytosis    Hypoxia    Altered mental status    Dementia with behavioral disturbance (HCC)    Elevated serum creatinine    Failure to thrive in adult    Generalized weakness       Medications listed as ordered at the time of discharge from hospital     Medication List          Accurate as of April 4, 2022 11:59 PM. If you have any questions, ask your nurse or doctor. CHANGE how you take these medications    gabapentin 300 MG capsule  Commonly known as: NEURONTIN  TAKE 1 CAPSULE AT BEDTIME  What changed: See the new instructions. * levothyroxine 50 MCG tablet  Commonly known as: SYNTHROID  TAKE 1 TABLET DAILY  What changed: See the new instructions. * levothyroxine 75 MCG tablet  Commonly known as: SYNTHROID  Take 1 tablet by mouth daily  What changed: You were already taking a medication with the same name, and this prescription was added. Make sure you understand how and when to take each. Changed by: Eloise Mishra DO         * This list has 2 medication(s) that are the same as other medications prescribed for you. Read the directions carefully, and ask your doctor or other care provider to review them with you.             CONTINUE taking these medications    diphenhydrAMINE 25 MG capsule  Commonly known as: BENADRYL     enalapril 5 MG tablet  Commonly known as: VASOTEC  TAKE 1 TABLET DAILY     miSOPROStol 200 MCG tablet  Commonly known as: CYTOTEC  TAKE 1 TABLET DAILY     oxyCODONE-acetaminophen 5-325 MG per tablet  Commonly known as: PERCOCET  TAKE 1 TABLET BY MOUTH EVERY 8 HOURS AS NEEDED FOR PAIN FOR UP TO 30 DAYS. - REDUCE DOSES TAKEN AS PAIN BECOMES MANAGEABLE     traZODone 50 MG tablet  Commonly known as: DESYREL  TAKE 0.5-1 TABLETS BY MOUTH NIGHTLY AS NEEDED FOR SLEEP           Where to Get Your Medications      These medications were sent to [address] [phone] - F [phone]  [place] [address]    Phone: [phone]   · levothyroxine 75 MCG tablet          Medications marked \"taking\" at this time  Outpatient Medications Marked as Taking for the 4/4/22 encounter (Office Visit) with Jazmyn Mustafa, DO   Medication Sig Dispense Refill    levothyroxine (SYNTHROID) 75 MCG tablet Take 1 tablet by mouth daily 90 tablet 1    oxyCODONE-acetaminophen (PERCOCET) 5-325 MG per tablet TAKE 1 TABLET BY MOUTH EVERY 8 HOURS AS NEEDED FOR PAIN FOR UP TO 30 DAYS. - REDUCE DOSES TAKEN AS PAIN BECOMES MANAGEABLE 90 tablet 0    traZODone (DESYREL) 50 MG tablet TAKE 0.5-1 TABLETS BY MOUTH NIGHTLY AS NEEDED FOR SLEEP 30 tablet 1    diphenhydrAMINE (BENADRYL) 25 MG capsule Take 25 mg by mouth every 6 hours as needed for Itching or Allergies Keeps pt awake      enalapril (VASOTEC) 5 MG tablet TAKE 1 TABLET DAILY 90 tablet 1    levothyroxine (SYNTHROID) 50 MCG tablet TAKE 1 TABLET DAILY (Patient taking differently: Take 75 mcg by mouth Daily Per pt's family) 30 tablet 11    miSOPROStol (CYTOTEC) 200 MCG tablet TAKE 1 TABLET DAILY 60 tablet 5    gabapentin (NEURONTIN) 300 MG capsule TAKE 1 CAPSULE AT BEDTIME (Patient taking differently: 300 mg.  Pt still taking per family) 90 capsule 3        Medications patient taking as of now reconciled against medications ordered at time of hospital discharge: Yes    Review of Systems    Objective:    /65 (Site: Left Upper Arm, Position: Sitting, Cuff Size: Child)   Pulse 59   Wt 86 lb 6.4 oz (39.2 kg)   SpO2 93%   BMI 16.33 kg/m²   Physical Exam  Vitals reviewed. Constitutional:       Comments: Thin, minimally involved in visit   HENT:      Head: Normocephalic and atraumatic. Cardiovascular:      Rate and Rhythm: Normal rate and regular rhythm. Pulmonary:      Effort: Pulmonary effort is normal.      Breath sounds: Normal breath sounds. Neurological:      General: No focal deficit present. Mental Status: She is alert and oriented to person, place, and time. Psychiatric:         Behavior: Behavior normal.      Comments: Tired         An electronic signature was used to authenticate this note.   --Harley Quezada, DO

## 2022-04-06 ENCOUNTER — CARE COORDINATION (OUTPATIENT)
Dept: CASE MANAGEMENT | Age: 87
End: 2022-04-06

## 2022-04-06 NOTE — CARE COORDINATION
[facility] Transitions Follow Up Call    2022    Patient: Jayme Valentine  Patient : 1935   MRN: [id number]  Reason for Admission: unresponsiveness, acute metabolic encephalopathy, hypoxia, pulm nodule on CT, elevated creatinine, UTI, generalized weakness, dementia, failure to thrive, HTN, hypothyroidism, underweight -> [facility]  Discharge Date: 3/28/22 RARS: Readmission Risk Score: 10.1 ( )    Needs to be reviewed by the provider   Additional needs identified to be addressed with provider: No         Method of communication with provider : none    Care Transition Nurse (CTN) contacted the family by telephone to follow up after recent hospital admission. Addressed changes since last contact: PCP OV note reviewed  Discussed follow-up appointments. If no appointment was previously scheduled, appointment scheduling offered: no.   Non-[facility] follow up appointment(s): NA    Advance Care Planning:   Does patient have an Advance Directive: has ACP docs. Discussed appropriate site of care based on symptoms and resources available to patient including: PCP  Specialist  Home health. The family agrees to contact the PCP office for questions related to their healthcare. Patients top risk factors for readmission: medical condition-failure to thrive  Interventions to address risk factors: reviewed resources    Family reports she is doing well. Eating now. Active with HC. Expect nurse today for labs. have new thyroid med from PCP OV. Deny needs at this time. CTN provided contact information for future needs. Plan for follow-up call in 7-10 days based on severity of symptoms and risk factors. Plan for next call: symptom mgmt      Follow Up  No future appointments.     Sherif Mackenzie RN

## 2022-04-07 LAB
FOLATE: 11.7
TSH SERPL DL<=0.05 MIU/L-ACNC: 25.1 UIU/ML
VITAMIN B-12: 864

## 2022-04-08 DIAGNOSIS — D75.89 MACROCYTOSIS: ICD-10-CM

## 2022-04-08 DIAGNOSIS — E03.9 ACQUIRED HYPOTHYROIDISM: ICD-10-CM

## 2022-04-13 ENCOUNTER — CARE COORDINATION (OUTPATIENT)
Dept: CASE MANAGEMENT | Age: 87
End: 2022-04-13

## 2022-04-13 NOTE — CARE COORDINATION
[facility] Transitions Follow Up Call    2022    Patient: Keisha Guzman  Patient : 1935   MRN: [id number]  Reason for Admission: unresponsiveness, acute metabolic encephalopathy, hypoxia, pulm nodule on CT, elevated creatinine, UTI, generalized weakness, dementia, failure to thrive, HTN, hypothyroidism, underweight -> [facility]  Discharge Date: 3/28/22 RARS: Readmission Risk Score: 10.1 ( )       Needs to be reviewed by the provider   Additional needs identified to be addressed with provider: NO         Method of communication with provider : none    Care Transition Nurse (CTN) contacted the family by telephone to follow up after recent hospital admission. Addressed changes since last contact: none  Discussed follow-up appointments. If no appointment was previously scheduled, appointment scheduling offered: no - completed TCM visit on 2022. Non-[facility] follow up appointment(s): NA    Advance Care Planning:   Does patient have an Advance Directive: Reviewed on prior CTN outreach. Discussed appropriate site of care based on symptoms and resources available to patient including: PCP  Specialist  Home health. The family agrees to contact the PCP office for questions related to their healthcare. Patients top risk factors for readmission: functional physical ability  medical condition-   Interventions to address risk factors: Education of patient/family/caregiver/guardian to support self-management-reports s/s to provider or Tiffany Salmon same day for recommendation or appointment    Home care nurse visited today and she was doing well. Denies needs. CTN provided contact information for future needs. Plan for follow-up call in 5-7 days based on severity of symptoms and risk factors. Plan for next call: symptom management-       Follow Up  No future appointments.     Iker Davis RN

## 2022-04-21 ENCOUNTER — CARE COORDINATION (OUTPATIENT)
Dept: CASE MANAGEMENT | Age: 87
End: 2022-04-21

## 2022-04-21 NOTE — CARE COORDINATION
[facility] Transitions Follow Up Call    2022    Patient: Chris Felix  Patient : 1935   MRN: [id number]  Reason for Admission: unresponsiveness, acute metabolic encephalopathy, hypoxia, pulm nodule on CT, elevated creatinine, UTI, generalized weakness, dementia, failure to thrive, HTN, hypothyroidism, underweight -> [facility], [facility] referral  Discharge Date: 3/28/22 RARS: Readmission Risk Score: 10.1 ( )      Needs to be reviewed by the provider   Additional needs identified to be addressed with provider: No         Method of communication with provider : none    Care Transition Nurse (CTN) contacted the family by telephone to follow up. Addressed changes since last contact: none  Discussed follow-up appointments. If no appointment was previously scheduled, appointment scheduling offered: no.   Is follow up appointment scheduled within 7 days of discharge? Completed TCM visit on 2022 with Dr Devon Garcia. The family agrees to contact the PCP office for questions related to their healthcare. Patients top risk factors for readmission: medical condition-see above  Interventions to address risk factors: call for appt as needed    Non-[facility] follow up appointment(s): [facility]. discharged and [facility] to start in May. Ms Kolby Coulter is eating twice daily and no known weight. Denies needs at this time. Reviewed no upcoming appointments scheduled but PCP office can be called for same/next day appt prn. she voices understanding. No further follow-up call indicated based on severity of symptoms and risk factors.     Care Transitions Subsequent and Final Call    Subsequent and Final Calls  Do you have any ongoing symptoms?: No  Have your medications changed?: No  Do you have any questions related to your medications?: No  Do you currently have any active services?: Yes  Are you currently active with any services?: Outpatient/Community Services  Do you have any needs or concerns that I can assist you with?: No  Identified Barriers: Impairment  Care Transitions Interventions  No Identified Needs  Other Interventions: Follow Up  No future appointments.     Seymour Gonzalez RN

## 2022-04-25 DIAGNOSIS — G89.4 CHRONIC PAIN SYNDROME: ICD-10-CM

## 2022-04-25 RX ORDER — OXYCODONE HYDROCHLORIDE AND ACETAMINOPHEN 5; 325 MG/1; MG/1
TABLET ORAL
Qty: 85 TABLET | Refills: 0 | Status: SHIPPED | OUTPATIENT
Start: 2022-04-25 | End: 2022-05-24

## 2022-04-25 NOTE — TELEPHONE ENCOUNTER
Refill Request - Controlled Substance    Last Seen Department: 4/4/2022  Last Seen by PCP: 3/14/2022    Last Written:3/28/2022    Last UDS: 11/9/2020    Med Agreement Signed On: 4/21/2015    Next Appointment: Visit date not found      Requested Prescriptions     Pending Prescriptions Disp Refills    oxyCODONE-acetaminophen (PERCOCET) 5-325 MG per tablet [Pharmacy Med Name: OXYCODONE-ACETAMINOPHEN 5-325 MG ORAL TABLET] 90 tablet      Sig: TAKE 1 TABLET BY MOUTH EVERY 8 HOURS AS NEEDED FOR PAIN FOR UP TO 30 DAYS.  - REDUCE DOSES TAKEN AS PAIN BECOMES MANAGEABLE

## 2022-05-17 ENCOUNTER — TELEPHONE (OUTPATIENT)
Dept: FAMILY MEDICINE CLINIC | Age: 87
End: 2022-05-17

## 2022-05-17 NOTE — TELEPHONE ENCOUNTER
Otoniel Sheehan with [facility] called needing [address] faxed to her at [phone].  Fax sent

## 2022-05-24 DIAGNOSIS — G89.4 CHRONIC PAIN SYNDROME: ICD-10-CM

## 2022-05-24 RX ORDER — OXYCODONE HYDROCHLORIDE AND ACETAMINOPHEN 5; 325 MG/1; MG/1
TABLET ORAL
Qty: 85 TABLET | Refills: 0 | Status: SHIPPED | OUTPATIENT
Start: 2022-05-24 | End: 2022-06-29

## 2022-05-24 RX ORDER — GABAPENTIN 300 MG/1
CAPSULE ORAL
Qty: 90 CAPSULE | Refills: 3 | Status: SHIPPED | OUTPATIENT
Start: 2022-05-24 | End: 2022-08-22

## 2022-05-24 NOTE — TELEPHONE ENCOUNTER
Refill Request - Controlled Substance    CONFIRM preferrred pharmacy with the patient. Last Seen Department: 4/4/2022  Last Seen by PCP: 3/14/2022    Last Written: 04/25/2022 85 Tablet 0 Refills    Last UDS: 11/09/2020    Med Agreement Signed On: 04/21/2015    Next Appointment: Visit date not found    Requested Prescriptions     Pending Prescriptions Disp Refills    oxyCODONE-acetaminophen (PERCOCET) 5-325 MG per tablet [Pharmacy Med Name: OXYCODONE-ACETAMINOPHEN 5-325 MG ORAL TABLET] 85 tablet      Sig: TAKE 1 TABLET BY MOUTH EVERY 8 HOURS AS NEEDED FOR PAIN FOR UP TO 30 DAYS.  - REDUCE DOSES TAKEN AS PAIN BECOMES MANAGEABLE

## 2022-05-24 NOTE — TELEPHONE ENCOUNTER
Refill Request - Controlled Substance    CONFIRM preferrred pharmacy with the patient.      Last Seen Department: 4/4/2022  Last Seen by PCP: 3/14/2022    Last Written: 03/22/2021 90 Capsule 3 refills    Last UDS: 11/09/2020    Med Agreement Signed On: 04/21/2015    Next Appointment: Visit date not found    Requested Prescriptions     Pending Prescriptions Disp Refills    gabapentin (NEURONTIN) 300 MG capsule [Pharmacy Med Name: GABAPENTIN CAPS 300MG] 90 capsule 3     Sig: TAKE 1 CAPSULE AT BEDTIME

## 2022-06-06 DIAGNOSIS — I10 ESSENTIAL HYPERTENSION: ICD-10-CM

## 2022-06-06 NOTE — TELEPHONE ENCOUNTER
.  Refill Request     CONFIRM preferrred pharmacy with the patient. If Mail Order Rx - Pend for 90 day refill. Last Seen: Last Seen Department: 4/4/2022  Last Seen by PCP: Visit date not found    Last Written: 12-7-21 90 with 1     Next Appointment:   No future appointments.       Requested Prescriptions     Pending Prescriptions Disp Refills    enalapril (VASOTEC) 5 MG tablet [Pharmacy Med Name: ENALAPRIL MALEATE TABS 5MG] 90 tablet 3     Sig: TAKE 1 TABLET DAILY

## 2022-06-07 RX ORDER — ENALAPRIL MALEATE 5 MG/1
TABLET ORAL
Qty: 90 TABLET | Refills: 3 | Status: SHIPPED | OUTPATIENT
Start: 2022-06-07

## 2022-06-29 DIAGNOSIS — G89.4 CHRONIC PAIN SYNDROME: ICD-10-CM

## 2022-06-29 RX ORDER — TRAZODONE HYDROCHLORIDE 50 MG/1
25-50 TABLET ORAL NIGHTLY PRN
Qty: 30 TABLET | Refills: 1 | Status: SHIPPED | OUTPATIENT
Start: 2022-06-29 | End: 2022-08-03

## 2022-06-29 RX ORDER — OXYCODONE HYDROCHLORIDE AND ACETAMINOPHEN 5; 325 MG/1; MG/1
TABLET ORAL
Qty: 85 TABLET | Refills: 0 | Status: SHIPPED | OUTPATIENT
Start: 2022-06-29 | End: 2022-08-01

## 2022-06-29 NOTE — TELEPHONE ENCOUNTER
.  Refill Request     CONFIRM preferrred pharmacy with the patient. If Mail Order Rx - Pend for 90 day refill. Last Seen: Last Seen Department: 4/4/2022  Last Seen by PCP: 3/14/2022    Last Written: 5-24-22 85 with 0     Next Appointment:   No future appointments. Requested Prescriptions     Pending Prescriptions Disp Refills    oxyCODONE-acetaminophen (PERCOCET) 5-325 MG per tablet [Pharmacy Med Name: OXYCODONE-ACETAMINOPHEN 5-325 MG ORAL TABLET] 85 tablet 0     Sig: TAKE 1 TABLET BY MOUTH EVERY 8 HOURS AS NEEDED FOR PAIN FOR UP TO 30 DAYS.  - REDUCE DOSES TAKEN AS PAIN BECOMES MANAGEABLE    traZODone (DESYREL) 50 MG tablet [Pharmacy Med Name: TRAZODONE HCL 50 MG ORAL TABLET] 30 tablet 1     Sig: TAKE 0.5-1 TABLETS BY MOUTH NIGHTLY AS NEEDED FOR SLEEP

## 2022-08-01 DIAGNOSIS — G89.4 CHRONIC PAIN SYNDROME: ICD-10-CM

## 2022-08-01 RX ORDER — OXYCODONE HYDROCHLORIDE AND ACETAMINOPHEN 5; 325 MG/1; MG/1
TABLET ORAL
Qty: 85 TABLET | Refills: 0 | Status: SHIPPED | OUTPATIENT
Start: 2022-08-01 | End: 2022-08-31

## 2022-08-01 NOTE — TELEPHONE ENCOUNTER
Refill Request - Controlled Substance    CONFIRM preferrred pharmacy with the patient. Last Seen Department: 4/4/2022  Last Seen by PCP: 3/14/2022    Last Written: 6/29/2022 85 Tablet 0 Refills    Last UDS: 11/9/2020    Med Agreement Signed On: 4/21/2015    Next Appointment: Visit date not found      Requested Prescriptions     Pending Prescriptions Disp Refills    oxyCODONE-acetaminophen (PERCOCET) 5-325 MG per tablet [Pharmacy Med Name: OXYCODONE-ACETAMINOPHEN 5-325 MG ORAL TABLET] 85 tablet      Sig: TAKE 1 TABLET BY MOUTH EVERY 8 HOURS AS NEEDED FOR PAIN FOR UP TO 30 DAYS.  - REDUCE DOSES TAKEN AS PAIN BECOMES MANAGEABLE

## 2022-08-03 RX ORDER — TRAZODONE HYDROCHLORIDE 50 MG/1
25-50 TABLET ORAL NIGHTLY PRN
Qty: 30 TABLET | Refills: 1 | Status: SHIPPED | OUTPATIENT
Start: 2022-08-03

## 2022-08-03 NOTE — TELEPHONE ENCOUNTER
.Refill Request     CONFIRM preferrred pharmacy with the patient. If Mail Order Rx - Pend for 90 day refill. Last Seen: Last Seen Department: 4/4/2022  Last Seen by PCP: 3/14/2022    Last Written: 6-29-22 30 with 1     Next Appointment:   No future appointments.         Requested Prescriptions     Pending Prescriptions Disp Refills    traZODone (DESYREL) 50 MG tablet [Pharmacy Med Name: TRAZODONE HCL 50 MG ORAL TABLET] 30 tablet 1     Sig: TAKE 0.5-1 TABLETS BY MOUTH NIGHTLY AS NEEDED FOR SLEEP

## 2022-10-31 NOTE — TELEPHONE ENCOUNTER
.  Last office visit 12/5/2019     Last written 4/6/20 #90 no refills    Next office visit scheduled none    Requested Prescriptions     Pending Prescriptions Disp Refills    oxyCODONE-acetaminophen (PERCOCET) 5-325 MG per tablet [Pharmacy Med Name: OXYCOD/ACETAMINO 5-325 MG TABS] 90 tablet      Sig: TAKE 1 TABLET BY MOUTH EVERY 8 HOURS AS NEEDED FOR PAIN FOR UP TO 30 DAYS.  -REDUCE DOSES TAKEN AS PAIN BECOMES MANAGEABLE     uds- none  Med contract- none Left axillary vein. Femoral access needle used. Using fluoro and ultrasound guidance.  Difficult access, performed venogram